# Patient Record
Sex: MALE | HISPANIC OR LATINO | URBAN - METROPOLITAN AREA
[De-identification: names, ages, dates, MRNs, and addresses within clinical notes are randomized per-mention and may not be internally consistent; named-entity substitution may affect disease eponyms.]

---

## 2021-03-03 ENCOUNTER — HOSPITAL ENCOUNTER (OUTPATIENT)
Dept: VACCINE CLINIC | Facility: HOSPITAL | Age: 36
Discharge: HOME | End: 2021-03-03
Attending: INTERNAL MEDICINE

## 2021-03-24 ENCOUNTER — HOSPITAL ENCOUNTER (OUTPATIENT)
Dept: VACCINE CLINIC | Facility: HOSPITAL | Age: 36
Discharge: HOME | End: 2021-03-24
Attending: INTERNAL MEDICINE

## 2022-08-09 ENCOUNTER — APPOINTMENT (OUTPATIENT)
Dept: CT IMAGING | Facility: HOSPITAL | Age: 37
End: 2022-08-09

## 2022-08-09 ENCOUNTER — APPOINTMENT (OUTPATIENT)
Dept: GENERAL RADIOLOGY | Facility: HOSPITAL | Age: 37
End: 2022-08-09

## 2022-08-09 ENCOUNTER — HOSPITAL ENCOUNTER (INPATIENT)
Facility: HOSPITAL | Age: 37
LOS: 3 days | Discharge: HOME | End: 2022-08-12
Attending: EMERGENCY MEDICINE | Admitting: HOSPITALIST

## 2022-08-09 DIAGNOSIS — I48.91 ATRIAL FIBRILLATION WITH RAPID VENTRICULAR RESPONSE: ICD-10-CM

## 2022-08-09 DIAGNOSIS — R41.82 ALTERED MENTAL STATUS, UNSPECIFIED ALTERED MENTAL STATUS TYPE: Primary | ICD-10-CM

## 2022-08-09 DIAGNOSIS — A41.9 SEPSIS, DUE TO UNSPECIFIED ORGANISM, UNSPECIFIED WHETHER ACUTE ORGAN DYSFUNCTION PRESENT: ICD-10-CM

## 2022-08-09 DIAGNOSIS — F19.10 SUBSTANCE ABUSE: ICD-10-CM

## 2022-08-09 DIAGNOSIS — J18.9 MULTIFOCAL PNEUMONIA: ICD-10-CM

## 2022-08-09 DIAGNOSIS — R09.02 HYPOXIA: ICD-10-CM

## 2022-08-09 LAB
ALBUMIN SERPL-MCNC: 4.5 G/DL (ref 3.5–5.2)
ALBUMIN/GLOB SERPL: 1.5 G/DL
ALP SERPL-CCNC: 80 U/L (ref 39–117)
ALT SERPL W P-5'-P-CCNC: 35 U/L (ref 1–41)
AMPHET+METHAMPHET UR QL: POSITIVE
ANION GAP SERPL CALCULATED.3IONS-SCNC: 12.9 MMOL/L (ref 5–15)
APAP SERPL-MCNC: <5 MCG/ML (ref 0–30)
APTT PPP: 28.3 SECONDS (ref 24.2–34.2)
APTT PPP: 64.8 SECONDS (ref 24.2–34.2)
ARTERIAL PATENCY WRIST A: NEGATIVE
ARTERIAL PATENCY WRIST A: POSITIVE
AST SERPL-CCNC: 43 U/L (ref 1–40)
BARBITURATES UR QL SCN: NEGATIVE
BASE EXCESS BLDA CALC-SCNC: -10.7 MMOL/L (ref -2–2)
BASE EXCESS BLDA CALC-SCNC: -8 MMOL/L (ref -2–2)
BASE EXCESS BLDA CALC-SCNC: -8.5 MMOL/L (ref -2–2)
BASE EXCESS BLDA CALC-SCNC: -9.5 MMOL/L (ref -2–2)
BASOPHILS # BLD AUTO: 0.08 10*3/MM3 (ref 0–0.2)
BASOPHILS NFR BLD AUTO: 0.7 % (ref 0–1.5)
BDY SITE: ABNORMAL
BENZODIAZ UR QL SCN: NEGATIVE
BILIRUB SERPL-MCNC: 1.5 MG/DL (ref 0–1.2)
BUN SERPL-MCNC: 18 MG/DL (ref 6–20)
BUN/CREAT SERPL: 9.1 (ref 7–25)
CA-I BLDA-SCNC: 1.01 MMOL/L (ref 1.13–1.32)
CA-I BLDA-SCNC: 1.21 MMOL/L (ref 1.13–1.32)
CALCIUM SPEC-SCNC: 9.4 MG/DL (ref 8.6–10.5)
CANNABINOIDS SERPL QL: NEGATIVE
CHLORIDE BLDA-SCNC: 106 MMOL/L (ref 98–106)
CHLORIDE BLDA-SCNC: 115 MMOL/L (ref 98–106)
CHLORIDE SERPL-SCNC: 104 MMOL/L (ref 98–107)
CK SERPL-CCNC: 240 U/L (ref 20–200)
CO2 SERPL-SCNC: 26.1 MMOL/L (ref 22–29)
COCAINE UR QL: NEGATIVE
COHGB MFR BLD: 0.3 % (ref 0–1.5)
COHGB MFR BLD: 0.4 % (ref 0–1.5)
COHGB MFR BLD: 0.4 % (ref 0–1.5)
COHGB MFR BLD: 0.5 % (ref 0–1.5)
CREAT SERPL-MCNC: 1.98 MG/DL (ref 0.76–1.27)
D-LACTATE SERPL-SCNC: 1.7 MMOL/L (ref 0.5–2)
D-LACTATE SERPL-SCNC: 5.8 MMOL/L (ref 0.5–2)
DEPRECATED RDW RBC AUTO: 42.7 FL (ref 37–54)
EGFRCR SERPLBLD CKD-EPI 2021: 44.6 ML/MIN/1.73
EOSINOPHIL # BLD AUTO: 0.08 10*3/MM3 (ref 0–0.4)
EOSINOPHIL NFR BLD AUTO: 0.7 % (ref 0.3–6.2)
ERYTHROCYTE [DISTWIDTH] IN BLOOD BY AUTOMATED COUNT: 13.2 % (ref 12.3–15.4)
ETHANOL BLD-MCNC: <10 MG/DL (ref 0–10)
ETHANOL UR QL: <0.01 %
FHHB: 10.5 % (ref 0–5)
FHHB: 29.9 % (ref 0–5)
FHHB: 35 % (ref 0–5)
FHHB: 5.2 % (ref 0–5)
GAS FLOW AIRWAY: 0 LPM
GAS FLOW AIRWAY: 50 LPM
GLOBULIN UR ELPH-MCNC: 3 GM/DL
GLUCOSE BLDA-MCNC: 172 MMOL/L (ref 70–99)
GLUCOSE BLDA-MCNC: 53 MMOL/L (ref 70–99)
GLUCOSE BLDC GLUCOMTR-MCNC: 126 MG/DL (ref 70–99)
GLUCOSE BLDC GLUCOMTR-MCNC: 156 MG/DL (ref 70–99)
GLUCOSE BLDC GLUCOMTR-MCNC: 217 MG/DL (ref 70–99)
GLUCOSE BLDC GLUCOMTR-MCNC: 50 MG/DL (ref 70–99)
GLUCOSE BLDC GLUCOMTR-MCNC: 55 MG/DL (ref 70–99)
GLUCOSE BLDC GLUCOMTR-MCNC: 80 MG/DL (ref 70–99)
GLUCOSE BLDC GLUCOMTR-MCNC: 82 MG/DL (ref 70–99)
GLUCOSE BLDC GLUCOMTR-MCNC: 84 MG/DL (ref 70–99)
GLUCOSE BLDC GLUCOMTR-MCNC: 93 MG/DL (ref 70–99)
GLUCOSE SERPL-MCNC: 233 MG/DL (ref 65–99)
HAV IGM SERPL QL IA: NORMAL
HBV CORE IGM SERPL QL IA: NORMAL
HBV SURFACE AG SERPL QL IA: NORMAL
HCO3 BLDA-SCNC: 18.4 MMOL/L (ref 22–26)
HCO3 BLDA-SCNC: 19 MMOL/L (ref 22–26)
HCO3 BLDA-SCNC: 20.2 MMOL/L (ref 22–26)
HCO3 BLDA-SCNC: 20.3 MMOL/L (ref 22–26)
HCT VFR BLD AUTO: 54.4 % (ref 37.5–51)
HCV AB SER DONR QL: NORMAL
HGB BLD-MCNC: 16.8 G/DL (ref 13–17.7)
HGB BLDA-MCNC: 15 G/DL (ref 13.8–16.4)
HGB BLDA-MCNC: 18 G/DL (ref 13.8–16.4)
HGB BLDA-MCNC: 18.2 G/DL (ref 13.8–16.4)
HGB BLDA-MCNC: 18.6 G/DL (ref 13.8–16.4)
HIV1+2 AB SER QL: NORMAL
HOLD SPECIMEN: NORMAL
HOLD SPECIMEN: NORMAL
IMM GRANULOCYTES # BLD AUTO: 0.49 10*3/MM3 (ref 0–0.05)
IMM GRANULOCYTES NFR BLD AUTO: 4.2 % (ref 0–0.5)
INHALED O2 CONCENTRATION: 100 %
INHALED O2 CONCENTRATION: 100 %
INHALED O2 CONCENTRATION: 21 %
INHALED O2 CONCENTRATION: 21 %
INR PPP: 1.25 (ref 0.86–1.15)
L PNEUMO1 AG UR QL IA: NEGATIVE
LACTATE BLDA-SCNC: 1.46 MMOL/L (ref 0.5–2)
LACTATE BLDA-SCNC: 5.27 MMOL/L (ref 0.5–2)
LACTATE BLDA-SCNC: ABNORMAL MMOL/L
LYMPHOCYTES # BLD AUTO: 2.02 10*3/MM3 (ref 0.7–3.1)
LYMPHOCYTES NFR BLD AUTO: 17.4 % (ref 19.6–45.3)
MCH RBC QN AUTO: 27.3 PG (ref 26.6–33)
MCHC RBC AUTO-ENTMCNC: 30.9 G/DL (ref 31.5–35.7)
MCV RBC AUTO: 88.3 FL (ref 79–97)
METHADONE UR QL SCN: NEGATIVE
METHGB BLD QL: 0.1 % (ref 0–1.5)
METHGB BLD QL: 0.2 % (ref 0–1.5)
METHGB BLD QL: 0.3 % (ref 0–1.5)
METHGB BLD QL: 0.4 % (ref 0–1.5)
MODALITY: ABNORMAL
MONOCYTES # BLD AUTO: 0.45 10*3/MM3 (ref 0.1–0.9)
MONOCYTES NFR BLD AUTO: 3.9 % (ref 5–12)
NEUTROPHILS NFR BLD AUTO: 73.1 % (ref 42.7–76)
NEUTROPHILS NFR BLD AUTO: 8.49 10*3/MM3 (ref 1.7–7)
NOTE: ABNORMAL
NRBC BLD AUTO-RTO: 0 /100 WBC (ref 0–0.2)
NT-PROBNP SERPL-MCNC: 21.7 PG/ML (ref 0–450)
OPIATES UR QL: POSITIVE
OXYCODONE UR QL SCN: NEGATIVE
OXYHGB MFR BLDV: 64.3 % (ref 94–99)
OXYHGB MFR BLDV: 69.6 % (ref 94–99)
OXYHGB MFR BLDV: 88.8 % (ref 94–99)
OXYHGB MFR BLDV: 94.1 % (ref 94–99)
PCO2 BLDA: 43.9 MM HG (ref 35–45)
PCO2 BLDA: 52.2 MM HG (ref 35–45)
PCO2 BLDA: 53.5 MM HG (ref 35–45)
PCO2 BLDA: 59.9 MM HG (ref 35–45)
PEEP RESPIRATORY: 10 CM[H2O]
PH BLDA: 7.15 PH UNITS (ref 7.35–7.45)
PH BLDA: 7.17 PH UNITS (ref 7.35–7.45)
PH BLDA: 7.2 PH UNITS (ref 7.35–7.45)
PH BLDA: 7.25 PH UNITS (ref 7.35–7.45)
PLATELET # BLD AUTO: 440 10*3/MM3 (ref 140–450)
PMV BLD AUTO: 9.1 FL (ref 6–12)
PO2 BLD: 62 MM[HG] (ref 0–500)
PO2 BLD: 74 MM[HG] (ref 0–500)
PO2 BLD: <193 MM[HG] (ref 0–500)
PO2 BLD: <193 MM[HG] (ref 0–500)
PO2 BLDA: 61.8 MM HG (ref 80–100)
PO2 BLDA: 74.3 MM HG (ref 80–100)
PO2 BLDA: <40.5 MM HG (ref 80–100)
PO2 BLDA: <40.5 MM HG (ref 80–100)
POTASSIUM BLDA-SCNC: 3.33 MMOL/L (ref 3.5–5)
POTASSIUM BLDA-SCNC: 3.87 MMOL/L (ref 3.5–5)
POTASSIUM SERPL-SCNC: 3.9 MMOL/L (ref 3.5–5.2)
PROCALCITONIN SERPL-MCNC: 5.5 NG/ML (ref 0–0.25)
PROT SERPL-MCNC: 7.5 G/DL (ref 6–8.5)
PROTHROMBIN TIME: 15.9 SECONDS (ref 11.8–14.9)
QT INTERVAL: 332 MS
RBC # BLD AUTO: 6.16 10*6/MM3 (ref 4.14–5.8)
S PNEUM AG SPEC QL LA: NEGATIVE
SALICYLATES SERPL-MCNC: <0.3 MG/DL
SAO2 % BLDCOA: 64.8 % (ref 95–99)
SAO2 % BLDCOA: 69.9 % (ref 95–99)
SAO2 % BLDCOA: 89.4 % (ref 95–99)
SAO2 % BLDCOA: 94.8 % (ref 95–99)
SARS-COV-2 RNA PNL SPEC NAA+PROBE: NOT DETECTED
SET MECH RESP RATE: 20
SODIUM BLDA-SCNC: 142.3 MMOL/L (ref 136–146)
SODIUM BLDA-SCNC: 143.1 MMOL/L (ref 136–146)
SODIUM SERPL-SCNC: 143 MMOL/L (ref 136–145)
T4 FREE SERPL-MCNC: 1.18 NG/DL (ref 0.93–1.7)
TROPONIN T SERPL-MCNC: 0.03 NG/ML (ref 0–0.03)
TSH SERPL DL<=0.05 MIU/L-ACNC: 0.69 UIU/ML (ref 0.27–4.2)
TSH SERPL DL<=0.05 MIU/L-ACNC: 2.09 UIU/ML (ref 0.27–4.2)
VENTILATOR MODE: AC
WBC NRBC COR # BLD: 11.61 10*3/MM3 (ref 3.4–10.8)
WHOLE BLOOD HOLD COAG: NORMAL
WHOLE BLOOD HOLD SPECIMEN: NORMAL

## 2022-08-09 PROCEDURE — 71045 X-RAY EXAM CHEST 1 VIEW: CPT

## 2022-08-09 PROCEDURE — P9612 CATHETERIZE FOR URINE SPEC: HCPCS

## 2022-08-09 PROCEDURE — 94799 UNLISTED PULMONARY SVC/PX: CPT

## 2022-08-09 PROCEDURE — 93005 ELECTROCARDIOGRAM TRACING: CPT | Performed by: EMERGENCY MEDICINE

## 2022-08-09 PROCEDURE — 80307 DRUG TEST PRSMV CHEM ANLYZR: CPT | Performed by: EMERGENCY MEDICINE

## 2022-08-09 PROCEDURE — 70450 CT HEAD/BRAIN W/O DYE: CPT

## 2022-08-09 PROCEDURE — 83050 HGB METHEMOGLOBIN QUAN: CPT | Performed by: EMERGENCY MEDICINE

## 2022-08-09 PROCEDURE — 83605 ASSAY OF LACTIC ACID: CPT | Performed by: EMERGENCY MEDICINE

## 2022-08-09 PROCEDURE — 25010000002 CEFEPIME PER 500 MG: Performed by: EMERGENCY MEDICINE

## 2022-08-09 PROCEDURE — 80179 DRUG ASSAY SALICYLATE: CPT | Performed by: EMERGENCY MEDICINE

## 2022-08-09 PROCEDURE — 82550 ASSAY OF CK (CPK): CPT | Performed by: EMERGENCY MEDICINE

## 2022-08-09 PROCEDURE — 31500 INSERT EMERGENCY AIRWAY: CPT | Performed by: INTERNAL MEDICINE

## 2022-08-09 PROCEDURE — 94660 CPAP INITIATION&MGMT: CPT

## 2022-08-09 PROCEDURE — 85730 THROMBOPLASTIN TIME PARTIAL: CPT | Performed by: HOSPITALIST

## 2022-08-09 PROCEDURE — 82962 GLUCOSE BLOOD TEST: CPT

## 2022-08-09 PROCEDURE — 0BH17EZ INSERTION OF ENDOTRACHEAL AIRWAY INTO TRACHEA, VIA NATURAL OR ARTIFICIAL OPENING: ICD-10-PCS | Performed by: INTERNAL MEDICINE

## 2022-08-09 PROCEDURE — 99291 CRITICAL CARE FIRST HOUR: CPT | Performed by: INTERNAL MEDICINE

## 2022-08-09 PROCEDURE — 93010 ELECTROCARDIOGRAM REPORT: CPT | Performed by: INTERNAL MEDICINE

## 2022-08-09 PROCEDURE — 02HV33Z INSERTION OF INFUSION DEVICE INTO SUPERIOR VENA CAVA, PERCUTANEOUS APPROACH: ICD-10-PCS | Performed by: INTERNAL MEDICINE

## 2022-08-09 PROCEDURE — 76937 US GUIDE VASCULAR ACCESS: CPT | Performed by: PHYSICIAN ASSISTANT

## 2022-08-09 PROCEDURE — 82805 BLOOD GASES W/O2 SATURATION: CPT | Performed by: EMERGENCY MEDICINE

## 2022-08-09 PROCEDURE — 36600 WITHDRAWAL OF ARTERIAL BLOOD: CPT | Performed by: INTERNAL MEDICINE

## 2022-08-09 PROCEDURE — 25010000002 NALOXONE HCL 2 MG/2ML SOLUTION PREFILLED SYRINGE

## 2022-08-09 PROCEDURE — 80053 COMPREHEN METABOLIC PANEL: CPT | Performed by: EMERGENCY MEDICINE

## 2022-08-09 PROCEDURE — 86631 CHLAMYDIA ANTIBODY: CPT | Performed by: HOSPITALIST

## 2022-08-09 PROCEDURE — 84443 ASSAY THYROID STIM HORMONE: CPT | Performed by: EMERGENCY MEDICINE

## 2022-08-09 PROCEDURE — 25010000002 CALCIUM GLUCONATE-NACL 1-0.675 GM/50ML-% SOLUTION: Performed by: HOSPITALIST

## 2022-08-09 PROCEDURE — 84443 ASSAY THYROID STIM HORMONE: CPT | Performed by: INTERNAL MEDICINE

## 2022-08-09 PROCEDURE — 25010000002 HEPARIN (PORCINE) 25000-0.45 UT/250ML-% SOLUTION: Performed by: HOSPITALIST

## 2022-08-09 PROCEDURE — 85025 COMPLETE CBC W/AUTO DIFF WBC: CPT | Performed by: EMERGENCY MEDICINE

## 2022-08-09 PROCEDURE — 36415 COLL VENOUS BLD VENIPUNCTURE: CPT | Performed by: EMERGENCY MEDICINE

## 2022-08-09 PROCEDURE — 82805 BLOOD GASES W/O2 SATURATION: CPT | Performed by: INTERNAL MEDICINE

## 2022-08-09 PROCEDURE — G0432 EIA HIV-1/HIV-2 SCREEN: HCPCS | Performed by: PHYSICIAN ASSISTANT

## 2022-08-09 PROCEDURE — 86713 LEGIONELLA ANTIBODY: CPT | Performed by: HOSPITALIST

## 2022-08-09 PROCEDURE — 25010000002 PROPOFOL 10 MG/ML EMULSION: Performed by: INTERNAL MEDICINE

## 2022-08-09 PROCEDURE — 99285 EMERGENCY DEPT VISIT HI MDM: CPT

## 2022-08-09 PROCEDURE — U0004 COV-19 TEST NON-CDC HGH THRU: HCPCS | Performed by: EMERGENCY MEDICINE

## 2022-08-09 PROCEDURE — 80074 ACUTE HEPATITIS PANEL: CPT | Performed by: PHYSICIAN ASSISTANT

## 2022-08-09 PROCEDURE — 25010000002 PROPOFOL 10 MG/ML EMULSION

## 2022-08-09 PROCEDURE — 82077 ASSAY SPEC XCP UR&BREATH IA: CPT | Performed by: EMERGENCY MEDICINE

## 2022-08-09 PROCEDURE — 87899 AGENT NOS ASSAY W/OPTIC: CPT | Performed by: HOSPITALIST

## 2022-08-09 PROCEDURE — 83050 HGB METHEMOGLOBIN QUAN: CPT | Performed by: INTERNAL MEDICINE

## 2022-08-09 PROCEDURE — 99291 CRITICAL CARE FIRST HOUR: CPT | Performed by: HOSPITALIST

## 2022-08-09 PROCEDURE — 94002 VENT MGMT INPAT INIT DAY: CPT

## 2022-08-09 PROCEDURE — 84439 ASSAY OF FREE THYROXINE: CPT | Performed by: EMERGENCY MEDICINE

## 2022-08-09 PROCEDURE — 80143 DRUG ASSAY ACETAMINOPHEN: CPT | Performed by: EMERGENCY MEDICINE

## 2022-08-09 PROCEDURE — 25010000002 FUROSEMIDE PER 20 MG: Performed by: PHYSICIAN ASSISTANT

## 2022-08-09 PROCEDURE — 25010000002 FENTANYL CITRATE (PF) 50 MCG/ML SOLUTION: Performed by: INTERNAL MEDICINE

## 2022-08-09 PROCEDURE — 86603 ADENOVIRUS ANTIBODY: CPT | Performed by: HOSPITALIST

## 2022-08-09 PROCEDURE — 36556 INSERT NON-TUNNEL CV CATH: CPT | Performed by: PHYSICIAN ASSISTANT

## 2022-08-09 PROCEDURE — 36600 WITHDRAWAL OF ARTERIAL BLOOD: CPT | Performed by: EMERGENCY MEDICINE

## 2022-08-09 PROCEDURE — 5A1935Z RESPIRATORY VENTILATION, LESS THAN 24 CONSECUTIVE HOURS: ICD-10-PCS | Performed by: INTERNAL MEDICINE

## 2022-08-09 PROCEDURE — 87449 NOS EACH ORGANISM AG IA: CPT | Performed by: HOSPITALIST

## 2022-08-09 PROCEDURE — 82375 ASSAY CARBOXYHB QUANT: CPT | Performed by: EMERGENCY MEDICINE

## 2022-08-09 PROCEDURE — 71250 CT THORAX DX C-: CPT

## 2022-08-09 PROCEDURE — 86738 MYCOPLASMA ANTIBODY: CPT | Performed by: HOSPITALIST

## 2022-08-09 PROCEDURE — 83880 ASSAY OF NATRIURETIC PEPTIDE: CPT | Performed by: EMERGENCY MEDICINE

## 2022-08-09 PROCEDURE — B548ZZA ULTRASONOGRAPHY OF SUPERIOR VENA CAVA, GUIDANCE: ICD-10-PCS | Performed by: INTERNAL MEDICINE

## 2022-08-09 PROCEDURE — 84484 ASSAY OF TROPONIN QUANT: CPT | Performed by: EMERGENCY MEDICINE

## 2022-08-09 PROCEDURE — 84145 PROCALCITONIN (PCT): CPT | Performed by: HOSPITALIST

## 2022-08-09 PROCEDURE — 82375 ASSAY CARBOXYHB QUANT: CPT | Performed by: INTERNAL MEDICINE

## 2022-08-09 PROCEDURE — 25010000002 LORAZEPAM PER 2 MG: Performed by: HOSPITALIST

## 2022-08-09 PROCEDURE — 25010000002 CEFEPIME PER 500 MG: Performed by: HOSPITALIST

## 2022-08-09 PROCEDURE — 25010000002 VANCOMYCIN 5 G RECONSTITUTED SOLUTION: Performed by: HOSPITALIST

## 2022-08-09 PROCEDURE — 87040 BLOOD CULTURE FOR BACTERIA: CPT | Performed by: EMERGENCY MEDICINE

## 2022-08-09 PROCEDURE — 25010000002 VANCOMYCIN 5 G RECONSTITUTED SOLUTION: Performed by: EMERGENCY MEDICINE

## 2022-08-09 PROCEDURE — 85610 PROTHROMBIN TIME: CPT | Performed by: HOSPITALIST

## 2022-08-09 RX ORDER — NALOXONE HCL 0.4 MG/ML
0.4 VIAL (ML) INJECTION
Status: DISCONTINUED | OUTPATIENT
Start: 2022-08-09 | End: 2022-08-12 | Stop reason: HOSPADM

## 2022-08-09 RX ORDER — LORAZEPAM 2 MG/ML
2 INJECTION INTRAMUSCULAR
Status: DISCONTINUED | OUTPATIENT
Start: 2022-08-09 | End: 2022-08-10

## 2022-08-09 RX ORDER — FUROSEMIDE 10 MG/ML
40 INJECTION INTRAMUSCULAR; INTRAVENOUS EVERY 12 HOURS
Status: DISCONTINUED | OUTPATIENT
Start: 2022-08-09 | End: 2022-08-11

## 2022-08-09 RX ORDER — LORAZEPAM 2 MG/1
4 TABLET ORAL
Status: DISCONTINUED | OUTPATIENT
Start: 2022-08-09 | End: 2022-08-09

## 2022-08-09 RX ORDER — LORAZEPAM 2 MG/ML
4 INJECTION INTRAMUSCULAR
Status: DISCONTINUED | OUTPATIENT
Start: 2022-08-09 | End: 2022-08-10

## 2022-08-09 RX ORDER — LORAZEPAM 2 MG/ML
4 INJECTION INTRAMUSCULAR
Status: DISCONTINUED | OUTPATIENT
Start: 2022-08-09 | End: 2022-08-09

## 2022-08-09 RX ORDER — LORAZEPAM 2 MG/ML
2 INJECTION INTRAMUSCULAR
Status: DISCONTINUED | OUTPATIENT
Start: 2022-08-09 | End: 2022-08-09

## 2022-08-09 RX ORDER — ALUMINA, MAGNESIA, AND SIMETHICONE 2400; 2400; 240 MG/30ML; MG/30ML; MG/30ML
15 SUSPENSION ORAL EVERY 6 HOURS PRN
Status: DISCONTINUED | OUTPATIENT
Start: 2022-08-09 | End: 2022-08-10

## 2022-08-09 RX ORDER — SODIUM CHLORIDE 0.9 % (FLUSH) 0.9 %
10 SYRINGE (ML) INJECTION AS NEEDED
Status: DISCONTINUED | OUTPATIENT
Start: 2022-08-09 | End: 2022-08-12 | Stop reason: HOSPADM

## 2022-08-09 RX ORDER — ONDANSETRON 2 MG/ML
4 INJECTION INTRAMUSCULAR; INTRAVENOUS EVERY 6 HOURS PRN
Status: DISCONTINUED | OUTPATIENT
Start: 2022-08-09 | End: 2022-08-10

## 2022-08-09 RX ORDER — POLYETHYLENE GLYCOL 3350 17 G/17G
17 POWDER, FOR SOLUTION ORAL DAILY PRN
Status: DISCONTINUED | OUTPATIENT
Start: 2022-08-09 | End: 2022-08-10

## 2022-08-09 RX ORDER — SODIUM CHLORIDE 0.9 % (FLUSH) 0.9 %
10 SYRINGE (ML) INJECTION AS NEEDED
Status: DISCONTINUED | OUTPATIENT
Start: 2022-08-09 | End: 2022-08-09

## 2022-08-09 RX ORDER — POLYETHYLENE GLYCOL 3350 17 G/17G
17 POWDER, FOR SOLUTION ORAL DAILY PRN
Status: DISCONTINUED | OUTPATIENT
Start: 2022-08-09 | End: 2022-08-09

## 2022-08-09 RX ORDER — LORAZEPAM 2 MG/ML
1 INJECTION INTRAMUSCULAR
Status: DISCONTINUED | OUTPATIENT
Start: 2022-08-09 | End: 2022-08-10

## 2022-08-09 RX ORDER — FENTANYL CITRATE-0.9 % NACL/PF 10 MCG/ML
50-300 PLASTIC BAG, INJECTION (ML) INTRAVENOUS
Status: DISCONTINUED | OUTPATIENT
Start: 2022-08-09 | End: 2022-08-10

## 2022-08-09 RX ORDER — NALOXONE HYDROCHLORIDE 1 MG/ML
2 INJECTION INTRAMUSCULAR; INTRAVENOUS; SUBCUTANEOUS ONCE
Status: COMPLETED | OUTPATIENT
Start: 2022-08-09 | End: 2022-08-09

## 2022-08-09 RX ORDER — CEFEPIME 1 G/50ML
2 INJECTION, SOLUTION INTRAVENOUS EVERY 12 HOURS
Status: DISCONTINUED | OUTPATIENT
Start: 2022-08-09 | End: 2022-08-10

## 2022-08-09 RX ORDER — DEXTROSE MONOHYDRATE 25 G/50ML
25 INJECTION, SOLUTION INTRAVENOUS
Status: DISCONTINUED | OUTPATIENT
Start: 2022-08-09 | End: 2022-08-12 | Stop reason: HOSPADM

## 2022-08-09 RX ORDER — BISACODYL 5 MG/1
5 TABLET, DELAYED RELEASE ORAL DAILY PRN
Status: DISCONTINUED | OUTPATIENT
Start: 2022-08-09 | End: 2022-08-09

## 2022-08-09 RX ORDER — LORAZEPAM 2 MG/1
2 TABLET ORAL
Status: DISCONTINUED | OUTPATIENT
Start: 2022-08-09 | End: 2022-08-09

## 2022-08-09 RX ORDER — NALOXONE HYDROCHLORIDE 1 MG/ML
INJECTION INTRAMUSCULAR; INTRAVENOUS; SUBCUTANEOUS
Status: COMPLETED
Start: 2022-08-09 | End: 2022-08-09

## 2022-08-09 RX ORDER — SODIUM CHLORIDE 9 MG/ML
40 INJECTION, SOLUTION INTRAVENOUS AS NEEDED
Status: DISCONTINUED | OUTPATIENT
Start: 2022-08-09 | End: 2022-08-12 | Stop reason: HOSPADM

## 2022-08-09 RX ORDER — FENTANYL CITRATE 50 UG/ML
50 INJECTION, SOLUTION INTRAMUSCULAR; INTRAVENOUS
Status: DISCONTINUED | OUTPATIENT
Start: 2022-08-09 | End: 2022-08-10

## 2022-08-09 RX ORDER — FAMOTIDINE 10 MG/ML
20 INJECTION, SOLUTION INTRAVENOUS 2 TIMES DAILY
Status: DISCONTINUED | OUTPATIENT
Start: 2022-08-09 | End: 2022-08-10

## 2022-08-09 RX ORDER — LORAZEPAM 0.5 MG/1
1 TABLET ORAL
Status: DISCONTINUED | OUTPATIENT
Start: 2022-08-09 | End: 2022-08-10

## 2022-08-09 RX ORDER — LORAZEPAM 2 MG/ML
1 INJECTION INTRAMUSCULAR
Status: DISCONTINUED | OUTPATIENT
Start: 2022-08-09 | End: 2022-08-09

## 2022-08-09 RX ORDER — IPRATROPIUM BROMIDE AND ALBUTEROL SULFATE 2.5; .5 MG/3ML; MG/3ML
3 SOLUTION RESPIRATORY (INHALATION) EVERY 4 HOURS PRN
Status: DISCONTINUED | OUTPATIENT
Start: 2022-08-09 | End: 2022-08-12 | Stop reason: HOSPADM

## 2022-08-09 RX ORDER — BISACODYL 10 MG
10 SUPPOSITORY, RECTAL RECTAL DAILY PRN
Status: DISCONTINUED | OUTPATIENT
Start: 2022-08-09 | End: 2022-08-09

## 2022-08-09 RX ORDER — ALUMINA, MAGNESIA, AND SIMETHICONE 2400; 2400; 240 MG/30ML; MG/30ML; MG/30ML
15 SUSPENSION ORAL EVERY 6 HOURS PRN
Status: DISCONTINUED | OUTPATIENT
Start: 2022-08-09 | End: 2022-08-09

## 2022-08-09 RX ORDER — PROPOFOL 10 MG/ML
VIAL (ML) INTRAVENOUS
Status: COMPLETED
Start: 2022-08-09 | End: 2022-08-09

## 2022-08-09 RX ORDER — ENOXAPARIN SODIUM 100 MG/ML
40 INJECTION SUBCUTANEOUS DAILY
Status: DISCONTINUED | OUTPATIENT
Start: 2022-08-09 | End: 2022-08-09

## 2022-08-09 RX ORDER — AMOXICILLIN 250 MG
2 CAPSULE ORAL 2 TIMES DAILY
Status: DISCONTINUED | OUTPATIENT
Start: 2022-08-09 | End: 2022-08-10

## 2022-08-09 RX ORDER — LORAZEPAM 2 MG/1
2 TABLET ORAL
Status: DISCONTINUED | OUTPATIENT
Start: 2022-08-09 | End: 2022-08-10

## 2022-08-09 RX ORDER — ONDANSETRON 4 MG/1
4 TABLET, FILM COATED ORAL EVERY 6 HOURS PRN
Status: DISCONTINUED | OUTPATIENT
Start: 2022-08-09 | End: 2022-08-09

## 2022-08-09 RX ORDER — NITROGLYCERIN 0.4 MG/1
0.4 TABLET SUBLINGUAL
Status: DISCONTINUED | OUTPATIENT
Start: 2022-08-09 | End: 2022-08-12 | Stop reason: HOSPADM

## 2022-08-09 RX ORDER — NICOTINE POLACRILEX 4 MG
15 LOZENGE BUCCAL
Status: DISCONTINUED | OUTPATIENT
Start: 2022-08-09 | End: 2022-08-12 | Stop reason: HOSPADM

## 2022-08-09 RX ORDER — LORAZEPAM 2 MG/1
4 TABLET ORAL
Status: DISCONTINUED | OUTPATIENT
Start: 2022-08-09 | End: 2022-08-10

## 2022-08-09 RX ORDER — AMOXICILLIN 250 MG
2 CAPSULE ORAL 2 TIMES DAILY
Status: DISCONTINUED | OUTPATIENT
Start: 2022-08-09 | End: 2022-08-09

## 2022-08-09 RX ORDER — INSULIN LISPRO 100 [IU]/ML
0-14 INJECTION, SOLUTION INTRAVENOUS; SUBCUTANEOUS
Status: DISCONTINUED | OUTPATIENT
Start: 2022-08-09 | End: 2022-08-12 | Stop reason: HOSPADM

## 2022-08-09 RX ORDER — LORAZEPAM 0.5 MG/1
1 TABLET ORAL
Status: DISCONTINUED | OUTPATIENT
Start: 2022-08-09 | End: 2022-08-09

## 2022-08-09 RX ORDER — CALCIUM GLUCONATE 20 MG/ML
1 INJECTION, SOLUTION INTRAVENOUS ONCE
Status: COMPLETED | OUTPATIENT
Start: 2022-08-09 | End: 2022-08-09

## 2022-08-09 RX ORDER — DEXTROSE MONOHYDRATE 25 G/50ML
INJECTION, SOLUTION INTRAVENOUS
Status: COMPLETED
Start: 2022-08-09 | End: 2022-08-09

## 2022-08-09 RX ORDER — ONDANSETRON 2 MG/ML
4 INJECTION INTRAMUSCULAR; INTRAVENOUS EVERY 6 HOURS PRN
Status: DISCONTINUED | OUTPATIENT
Start: 2022-08-09 | End: 2022-08-09

## 2022-08-09 RX ORDER — LORAZEPAM 2 MG/ML
2 INJECTION INTRAMUSCULAR
Status: DISCONTINUED | OUTPATIENT
Start: 2022-08-09 | End: 2022-08-12 | Stop reason: HOSPADM

## 2022-08-09 RX ORDER — DILTIAZEM HCL IN NACL,ISO-OSM 125 MG/125
5-15 PLASTIC BAG, INJECTION (ML) INTRAVENOUS
Status: DISCONTINUED | OUTPATIENT
Start: 2022-08-09 | End: 2022-08-10

## 2022-08-09 RX ORDER — ACETAMINOPHEN 325 MG/1
650 TABLET ORAL EVERY 6 HOURS PRN
Status: DISCONTINUED | OUTPATIENT
Start: 2022-08-09 | End: 2022-08-10

## 2022-08-09 RX ORDER — HEPARIN SODIUM 10000 [USP'U]/100ML
12 INJECTION, SOLUTION INTRAVENOUS
Status: DISCONTINUED | OUTPATIENT
Start: 2022-08-09 | End: 2022-08-10

## 2022-08-09 RX ORDER — BISACODYL 10 MG
10 SUPPOSITORY, RECTAL RECTAL DAILY PRN
Status: DISCONTINUED | OUTPATIENT
Start: 2022-08-09 | End: 2022-08-10

## 2022-08-09 RX ORDER — CHLORHEXIDINE GLUCONATE 0.12 MG/ML
15 RINSE ORAL EVERY 12 HOURS SCHEDULED
Status: DISCONTINUED | OUTPATIENT
Start: 2022-08-09 | End: 2022-08-10

## 2022-08-09 RX ORDER — ONDANSETRON 4 MG/1
4 TABLET, FILM COATED ORAL EVERY 6 HOURS PRN
Status: DISCONTINUED | OUTPATIENT
Start: 2022-08-09 | End: 2022-08-10

## 2022-08-09 RX ORDER — SODIUM CHLORIDE 9 MG/ML
125 INJECTION, SOLUTION INTRAVENOUS CONTINUOUS
Status: DISCONTINUED | OUTPATIENT
Start: 2022-08-09 | End: 2022-08-09

## 2022-08-09 RX ORDER — SODIUM CHLORIDE 0.9 % (FLUSH) 0.9 %
10 SYRINGE (ML) INJECTION EVERY 12 HOURS SCHEDULED
Status: DISCONTINUED | OUTPATIENT
Start: 2022-08-09 | End: 2022-08-12 | Stop reason: HOSPADM

## 2022-08-09 RX ORDER — CEFEPIME 1 G/50ML
2 INJECTION, SOLUTION INTRAVENOUS ONCE
Status: COMPLETED | OUTPATIENT
Start: 2022-08-09 | End: 2022-08-09

## 2022-08-09 RX ORDER — NOREPINEPHRINE BIT/0.9 % NACL 8 MG/250ML
.02-.3 INFUSION BOTTLE (ML) INTRAVENOUS
Status: DISCONTINUED | OUTPATIENT
Start: 2022-08-09 | End: 2022-08-11

## 2022-08-09 RX ADMIN — FUROSEMIDE 40 MG: 10 INJECTION, SOLUTION INTRAMUSCULAR; INTRAVENOUS at 13:28

## 2022-08-09 RX ADMIN — SENNOSIDES AND DOCUSATE SODIUM 2 TABLET: 50; 8.6 TABLET ORAL at 13:28

## 2022-08-09 RX ADMIN — FAMOTIDINE 20 MG: 10 INJECTION INTRAVENOUS at 20:48

## 2022-08-09 RX ADMIN — NALOXONE HYDROCHLORIDE 2 MG: 1 INJECTION PARENTERAL at 11:36

## 2022-08-09 RX ADMIN — FENTANYL CITRATE 50 MCG: 50 INJECTION, SOLUTION INTRAMUSCULAR; INTRAVENOUS at 20:00

## 2022-08-09 RX ADMIN — PROPOFOL 50 MCG/KG/MIN: 10 INJECTION, EMULSION INTRAVENOUS at 20:49

## 2022-08-09 RX ADMIN — Medication 0.02 MCG/KG/MIN: at 14:42

## 2022-08-09 RX ADMIN — PROPOFOL 5 MCG/KG/MIN: 10 INJECTION, EMULSION INTRAVENOUS at 17:10

## 2022-08-09 RX ADMIN — Medication 1750 MG: at 10:38

## 2022-08-09 RX ADMIN — Medication 10 ML: at 20:48

## 2022-08-09 RX ADMIN — CHLORHEXIDINE GLUCONATE 15 ML: 1.2 RINSE ORAL at 20:49

## 2022-08-09 RX ADMIN — DEXTROSE MONOHYDRATE: 500 INJECTION PARENTERAL at 11:33

## 2022-08-09 RX ADMIN — LORAZEPAM 1 MG: 2 INJECTION INTRAMUSCULAR at 15:49

## 2022-08-09 RX ADMIN — HEPARIN SODIUM 12 UNITS/KG/HR: 10000 INJECTION, SOLUTION INTRAVENOUS at 14:16

## 2022-08-09 RX ADMIN — SODIUM CHLORIDE 2619 ML: 9 INJECTION, SOLUTION INTRAVENOUS at 10:37

## 2022-08-09 RX ADMIN — Medication 10 ML: at 13:28

## 2022-08-09 RX ADMIN — SODIUM CHLORIDE 1000 ML: 9 INJECTION, SOLUTION INTRAVENOUS at 09:55

## 2022-08-09 RX ADMIN — CEFEPIME 2 G: 1 INJECTION, SOLUTION INTRAVENOUS at 10:30

## 2022-08-09 RX ADMIN — Medication 5 MG/HR: at 13:26

## 2022-08-09 RX ADMIN — DEXTROSE MONOHYDRATE 25 G: 25 INJECTION, SOLUTION INTRAVENOUS at 13:29

## 2022-08-09 RX ADMIN — ACETAMINOPHEN 325MG 650 MG: 325 TABLET ORAL at 18:28

## 2022-08-09 RX ADMIN — FENTANYL CITRATE 50 MCG: 50 INJECTION, SOLUTION INTRAMUSCULAR; INTRAVENOUS at 17:15

## 2022-08-09 RX ADMIN — CALCIUM GLUCONATE 1 G: 20 INJECTION, SOLUTION INTRAVENOUS at 13:28

## 2022-08-09 RX ADMIN — SENNOSIDES AND DOCUSATE SODIUM 2 TABLET: 50; 8.6 TABLET ORAL at 20:47

## 2022-08-09 RX ADMIN — CEFEPIME 2 G: 1 INJECTION, SOLUTION INTRAVENOUS at 20:48

## 2022-08-09 RX ADMIN — NALOXONE HYDROCHLORIDE 2 MG: 1 INJECTION INTRAMUSCULAR; INTRAVENOUS; SUBCUTANEOUS at 11:36

## 2022-08-09 RX ADMIN — Medication 50 MCG/HR: at 20:09

## 2022-08-09 RX ADMIN — VANCOMYCIN HYDROCHLORIDE 750 MG: 5 INJECTION, POWDER, LYOPHILIZED, FOR SOLUTION INTRAVENOUS at 22:11

## 2022-08-09 NOTE — CONSULTS
[facility]   Consult Note    Patient Name: Gregory Miller  : 1985  MRN: [id number]  Primary Care Physician:  Provider, No Known  Referring Physician: No Known Provider  Date of admission: 2022    Inpatient Pulmonology Consult  Consult performed by: Jose Melendrez DO  Consult ordered by: Gregory Estevez DO        Subjective   Subjective     Reason for Consult/ Chief Complaint: Reason for consultation respiratory failure    History of Present Illness  Gregory Miller is a 36 y.o. male critically ill male in the ICU with respiratory failure  Patient was dropped off by friends today in the ER was very somnolent head borderline low glucose arterial blood gas does show a confirmed presence of both hypoxic and hypercapnic respiratory failure  The patient was given Narcan with some improvement in his mental status  He was given a second dose of Narcan and did start waking up more and endorse that he injected heroin  The patient is profoundly hypoxic and tachycardic  Chest imaging shows multifocal airspace disease    The patient also has pain around his nose and his face concerning for inhalational drug use as well however the patient has denied any paint huffing    Review of Systems   Able to obtain due to mental status  Personal History     Past Medical History:   Diagnosis Date   • Seizures (HCC)     childhood, last one at age 14   • Traumatic brain injury (HCC)     3 years ago       Past Surgical History:   Procedure Laterality Date   • BRAIN SURGERY         Family History: family history is not on file. Otherwise pertinent FHx was reviewed and not pertinent to current issue.    Social History:  reports that he has never smoked. He has never used smokeless tobacco. He reports current drug use. Drug: IV. He reports that he does not drink alcohol.    Home Medications:        Allergies:  No Known Allergies    Objective    Objective     Vitals:  Temp:  [93.4 °F (34.1  °C)-96.6 °F (35.9 °C)] 96.6 °F (35.9 °C)  Heart Rate:  [113-152] 139  Resp:  [26-39] 39  BP: ()/() 84/56  Flow (L/min):  [50] 50    Physical Exam  General ill-appearing 36-year-old male who appears older than stated age  Mouth and face-patient appears to have pain around his nose and the right side of his face  Lungs scattered crackles patient does have tachypnea with increased work of breathing  Cardiac the patient is tachycardic  Neuro the patient is arousable he is able to follow commands and answer questions but is somnolent  His abdomen is soft nontender nondistended  Result Review    Result Review:  I have personally reviewed the results from the time of this admission to 8/9/2022 12:54 EDT and agree with these findings:  [x]  Laboratory  [x]  Microbiology  [x]  Radiology  [x]  EKG/Telemetry   []  Cardiology/Vascular   []  Pathology  []  Old records  []  Other:    Most notable findings include: CT scan showing multifocal pneumonia    Assessment & Plan   Assessment / Plan     Brief Patient Summary:  Gregory Miller is a 36 y.o. male who clearly in the intensive care unit with respiratory failure    Active Hospital Problems:  Active Hospital Problems    Diagnosis    • AMS (altered mental status)      Assessment  aCute hypoxic and hypercapnic respiratory failure  Noncardiogenic pulmonary edema secondary to heroin  Acute heroin ingestion  Altered mental status-secondary to metabolic encephalopathy from heroin injection and CO2 narcosis  Assessment  Respiratory acidosis  Hemoptysis  Lactic acidosis  Acute renal failure  Transaminitis      Plan:   Patient has responded now after receiving additional dose of Narcan patient does endorse that he did acutely inject heroin this morning    Suspect that the patient's multifocal pneumonia likely secondary to pulmonary edema from heroin and from reversal heroin by Narcan    At this time we will place patient on noninvasive positive pressure  ventilation    Check COVID and flu    Broad-spectrum antibiotics    Check procalcitonin    Check HIV and hep C status    Check twelve-lead EKG and thyroid function study given the patient's tachycardia    Patient is critically ill in ICU with  respiratory failure, heroin ingestion lactic acidosis  I spent 40 minutes of critical care time excluding any procedure notes, spent in review, analysis, obtaining history and physical, formulating care plan, and I led multi-disciplinary critical care rounds with bedside nurse, respiratory therapist, clinical pharmacist and other allied services. I have discussed the case with primary service and other consultants as well.      Electronically signed by Jose Melendrez DO, 08/09/22, 12:54 PM EDT.    Called to reassess patient  Breathing into the 50s layered with 100% nonrebreather and Airvo at 100% FiO2  Very labored still was unable tolerate noninvasive  The decision was made to intubate patient with his consent  Patient was intubated with no issues  Will place patient on assist control tidal volume of 438 respiratory rate of 20 PEEP of 10  Repeat arterial blood gas 1 hour afterwards  Chest x-ray  Electronically signed by Jose Melendrez DO, 08/09/22, 4:24 PM EDT.

## 2022-08-09 NOTE — PROCEDURES
CENTRAL LINE PLACEMENT NOTE  Indication: acute respiratory failure requiring MV  Consent obtained: performed emergently  Time out: performed with ANJALI Fuller at bedside prior to procedure    Procedure:   The patient was placed in the supine position and the skin over the patient's right IJ vein was prepped with chlorhexidine. The patient was draped with full body drape in sterile fashion/sterile procedure was used.   Large-bore needle was used to cannulate the vessel with return of dark blood.  The guidewire was inserted into the needle using the Seldinger technique then needle was removed. Ultrasound was used to confirm the placement of the wire in the vein. A small nick was made in the skin and vessel was dilated with dilator. A triple-lumen CVC catheter was then placed into the vessel over the wire and the wire was removed. RN was notified of guidewire removal. All 3 ports freely flush and draw.  The catheter was securely fastened to the skin with suture. Next a sterile dressing was placed and a CXR ordered to confirm positioning of the line.     The patient tolerated the procedure well  Complications: no immediate complications noted      Procedure name: Ultrasound guidance for central line placement  Ultrasound was used to confirm cannulation of the right IJ vein with the finder needle during central line placement. Vessel was reviewed with ultrasound prior to procedure and was patent and laid just next to the right carotid artery. Ultrasound was used to assure correct placement of cannulating needle and picture was taken of guidewire being in correct placement prior to dilating vessel and image was uploaded to patient's electronic medical record.     Electronically signed by TIANA Mills 08/09/22 19:06 EDT

## 2022-08-09 NOTE — PROGRESS NOTES
"Pharmacy Consult-Vancomycin Dosing - day 1  Gregory Miller is a  36 y.o. male receiving vancomycin therapy.     Indication: PNA  Consulting Provider: Vidya Quevedo  Duration of therapy: 5 days or TBD    Goal AUC: 400 - 600 mg/L*hr    Current Antimicrobial Therapy  Cefepime    Labs  Results from last 7 days   Lab Units 08/09/22  0936   BUN mg/dL 18   CREATININE mg/dL 1.98*     Results from last 7 days   Lab Units 08/09/22  0936   WBC 10*3/mm3 11.61*     Evaluation of Dosing  Last Dose Received in the ED/Outside Facility: 8/9 @1038 loading dose vanc 1750mg x1  Is Patient on Dialysis or Renal Replacement: No    Ht - 177.8 cm (70\")  Wt - 90 kg (198 lb 6.6 oz)    Estimated Creatinine Clearance: 59.3 mL/min (A) (by C-G formula based on SCr of 1.98 mg/dL (H)).    Intake & Output (last 3 days)         08/06 0701  08/07 0700 08/07 0701  08/08 0700 08/08 0701  08/09 0700 08/09 0701  08/10 0700    IV Piggyback    3719    Total Intake(mL/kg)    3719 (41.3)    Net    +3719                  Microbiology and Radiology  8/9 blood cx: in process  8/9 sputum: to be collected  8/9 S pneumo Ag / legionella Ag: to be collected  8/9 CXR: Extensive airspace consolidation throughout the lung fields possibly secondary to ARDS, multifocal pneumonia or pulmonary edema.     Assessment/Plan:  36yoM, 90kg patient admitted to the ICU with respiratory failure, PTD vancomycin for pneumonia. Vancomycin 1750mg x1 loading dose administered this morning @1038. Plan to start a maintenance dose of vancomycin 750mg q12h this evening. [facility] predicts the following parameters:    Starting Regimen: 750 mg IV every 12 hours.  AUC24,ss: 522 mg/L.hr  PAUC*: 78 %  Ctrough,ss: 18.1 mg/L  Pconc*: 40 %  Tox.: 14 %    Labs ordered: 8/10 AM vanc random and BMP to assess renal function    "

## 2022-08-09 NOTE — ED PROVIDER NOTES
"Time: 9:42 AM EDT  Arrived by: ambulance  Chief Complaint: drug overdose  History provided by: patient  History is limited by: altered mental status    History of Present Illness:  Patient is a 36 y.o. year old male who presents to the emergency department with drug overdose    Patient arrives to ED via EMS from home. Patient medical history is limited, but per EMS patient has a history of seizures.    Per EMS, the patient became unresponsive at home and someone called EMS. Patient was unresponsive on scene and was given Narcan 2mg immediately. Patient became more responsive after administration of Narcan. EMS noted that patient had a \"track sonja \"and his left arm that was still actively beating on arrival to ED.      History provided by:  EMS personnel and medical records  History limited by:  Mental status change   used: No        Similar Symptoms Previously: none  Recently seen: none      Patient Care Team  Primary Care Provider: Provider, No Known    Past Medical History:     No Known Allergies  Past Medical History:   Diagnosis Date   • Seizures (HCC)     childhood, last one at age 14   • Traumatic brain injury (HCC)     3 years ago     Past Surgical History:   Procedure Laterality Date   • BRAIN SURGERY       History reviewed. No pertinent family history.    Home Medications:  Prior to Admission medications    Not on File        Social History:   Social History     Tobacco Use   • Smoking status: Never Smoker   • Smokeless tobacco: Never Used   Substance Use Topics   • Alcohol use: Never   • Drug use: Yes     Types: IV     Comment: heroin today, reports this was first attempt     Recent travel: not applicable    Review of Systems:  Review of Systems   Unable to perform ROS: Mental status change        Physical Exam:  BP (!) 84/56 (BP Location: Left arm, Patient Position: Sitting)   Pulse (!) 139   Temp 96.6 °F (35.9 °C)   Resp (!) 39   Ht 177.8 cm (70\")   Wt 90 kg (198 lb 6.6 oz)   " SpO2 93%   BMI 28.47 kg/m²     Physical Exam  Vitals and nursing note reviewed.   Constitutional:       General: He is not in acute distress.     Comments: Patient is reactive to verbal and painful stimuli.   HENT:      Head: Normocephalic.      Comments: Patient has healed incision on top of scalp. Patient has silver spray paint on the right side of his face  Eyes:      Comments: Left eye appears a nucleated. Right eye people dilated 5mm, with slight reaction to light   Cardiovascular:      Rate and Rhythm: Tachycardia present. Rhythm irregularly irregular.   Pulmonary:      Effort: Pulmonary effort is normal. No respiratory distress.      Breath sounds: Normal breath sounds.   Abdominal:      General: Abdomen is flat. Bowel sounds are normal.      Palpations: Abdomen is soft.      Tenderness: There is no abdominal tenderness.   Musculoskeletal:         General: Normal range of motion.      Cervical back: Normal range of motion and neck supple.   Skin:     General: Skin is warm and dry.      Capillary Refill: Capillary refill takes less than 2 seconds.   Neurological:      Mental Status: He is alert and oriented to person, place, and time. Mental status is at baseline.                Medications in the Emergency Department:  Medications   sodium chloride 0.9 % flush 10 mL (has no administration in time range)   nitroglycerin (NITROSTAT) SL tablet 0.4 mg (has no administration in time range)   sodium chloride 0.9 % flush 10 mL (has no administration in time range)   sodium chloride 0.9 % flush 10 mL (has no administration in time range)   sodium chloride 0.9 % infusion 40 mL (has no administration in time range)   Enoxaparin Sodium (LOVENOX) syringe 40 mg (has no administration in time range)   sodium chloride 0.9 % infusion (has no administration in time range)   sennosides-docusate (PERICOLACE) 8.6-50 MG per tablet 2 tablet (has no administration in time range)     And   polyethylene glycol (MIRALAX) packet 17 g  (has no administration in time range)     And   bisacodyl (DULCOLAX) EC tablet 5 mg (has no administration in time range)     And   bisacodyl (DULCOLAX) suppository 10 mg (has no administration in time range)   ondansetron (ZOFRAN) tablet 4 mg (has no administration in time range)     Or   ondansetron (ZOFRAN) injection 4 mg (has no administration in time range)   dextrose (GLUTOSE) oral gel 15 g (has no administration in time range)   dextrose (D50W) (25 g/50 mL) IV injection 25 g (has no administration in time range)   glucagon (human recombinant) (GLUCAGEN DIAGNOSTIC) injection 1 mg (has no administration in time range)   Insulin Lispro (humaLOG) injection 0-14 Units (has no administration in time range)   Pharmacy to dose vancomycin (has no administration in time range)   Pharmacy to Dose Cefepime (has no administration in time range)   aluminum-magnesium hydroxide-simethicone (MAALOX MAX) 400-400-40 MG/5ML suspension 15 mL (has no administration in time range)   calcium gluconate 1g/50ml 0.675% NaCl IV SOLN (has no administration in time range)   norepinephrine (LEVOPHED) 8 mg in 250 mL NS infusion (premix) (has no administration in time range)   naloxone (NARCAN) injection 0.4 mg (has no administration in time range)   dilTIAZem (CARDIZEM) bolus from bag 1 mg/mL 10 mg (has no administration in time range)   dilTIAZem (CARDIZEM) 125 mg in 125 mL 0.7% sodium chloride  infusion (has no administration in time range)   furosemide (LASIX) injection 40 mg (has no administration in time range)   sodium chloride 0.9 % bolus 1,000 mL (0 mL Intravenous Stopped 8/9/22 1036)   vancomycin 1750 mg/500 mL 0.9% NS IVPB ([facility]) (1,750 mg Intravenous New Bag 8/9/22 1038)   sodium chloride 0.9 % bolus 2,619 mL (0 mL/kg × 87.3 kg Intravenous Stopped 8/9/22 1121)   cefepime (MAXIPIME) IVPB 2 g (premix) in D5 (0 g Intravenous Stopped 8/9/22 1121)   dextrose (D50W) 50 % (25 g/50 mL) IV injection  - ADS Override Pull (  Given 8/9/22  1133)   Naloxone HCl (NARCAN) injection 2 mg (2 mg Intravenous Given 8/9/22 1136)        Labs  Lab Results (last 24 hours)     Procedure Component Value Units Date/Time    CBC & Differential [[id number]]  (Abnormal) Collected: 08/09/22 0936    Specimen: Blood Updated: 08/09/22 0943    Narrative:      The following orders were created for panel order CBC & Differential.  Procedure                               Abnormality         Status                     ---------                               -----------         ------                     CBC Auto Differential[[id number]]        Abnormal            Final result                 Please view results for these tests on the individual orders.    Comprehensive Metabolic Panel [[id number]]  (Abnormal) Collected: 08/09/22 0936    Specimen: Blood Updated: 08/09/22 1004     Glucose 233 mg/dL      BUN 18 mg/dL      Creatinine 1.98 mg/dL      Sodium 143 mmol/L      Potassium 3.9 mmol/L      Chloride 104 mmol/L      CO2 26.1 mmol/L      Calcium 9.4 mg/dL      Total Protein 7.5 g/dL      Albumin 4.50 g/dL      ALT (SGPT) 35 U/L      AST (SGOT) 43 U/L      Alkaline Phosphatase 80 U/L      Total Bilirubin 1.5 mg/dL      Globulin 3.0 gm/dL      A/G Ratio 1.5 g/dL      BUN/Creatinine Ratio 9.1     Anion Gap 12.9 mmol/L      eGFR 44.6 mL/min/1.73      Comment: National Kidney Foundation and American Society of Nephrology (ASN) Task Force recommended calculation based on the Chronic Kidney Disease Epidemiology Collaboration (CKD-EPI) equation refit without adjustment for race.       Narrative:      GFR Normal >60  Chronic Kidney Disease <60  Kidney Failure <15      Acetaminophen Level [[id number]]  (Normal) Collected: 08/09/22 0936    Specimen: Blood Updated: 08/09/22 1004     Acetaminophen <5.0 mcg/mL     Ethanol [[id number]] Collected: 08/09/22 0936    Specimen: Blood Updated: 08/09/22 1004     Ethanol <10 mg/dL      Ethanol % <0.010 %     Narrative:      Ethanol (Plasma)  <10  Essentially Negative    Toxic Concentrations           mg/dL    Flushing, slowing of reflexes    Impaired visual activity         Depression of CNS              >100  Possible Coma                  >300       Salicylate Level [[id number]]  (Normal) Collected: 08/09/22 0936    Specimen: Blood Updated: 08/09/22 1004     Salicylate <0.3 mg/dL     CBC Auto Differential [[id number]]  (Abnormal) Collected: 08/09/22 0936    Specimen: Blood Updated: 08/09/22 0943     WBC 11.61 10*3/mm3      RBC 6.16 10*6/mm3      Hemoglobin 16.8 g/dL      Hematocrit 54.4 %      MCV 88.3 fL      MCH 27.3 pg      MCHC 30.9 g/dL      RDW 13.2 %      RDW-SD 42.7 fl      MPV 9.1 fL      Platelets 440 10*3/mm3      Neutrophil % 73.1 %      Lymphocyte % 17.4 %      Monocyte % 3.9 %      Eosinophil % 0.7 %      Basophil % 0.7 %      Immature Grans % 4.2 %      Neutrophils, Absolute 8.49 10*3/mm3      Lymphocytes, Absolute 2.02 10*3/mm3      Monocytes, Absolute 0.45 10*3/mm3      Eosinophils, Absolute 0.08 10*3/mm3      Basophils, Absolute 0.08 10*3/mm3      Immature Grans, Absolute 0.49 10*3/mm3      nRBC 0.0 /100 WBC     TSH [[id number]]  (Normal) Collected: 08/09/22 0936    Specimen: Blood Updated: 08/09/22 1114     TSH 2.090 uIU/mL     T4, Free [[id number]]  (Normal) Collected: 08/09/22 0936    Specimen: Blood Updated: 08/09/22 1115     Free T4 1.18 ng/dL     Narrative:      Results may be falsely increased if patient taking Biotin.      BNP [[id number]]  (Normal) Collected: 08/09/22 0936    Specimen: Blood Updated: 08/09/22 1126     proBNP 21.7 pg/mL     Narrative:      Among patients with dyspnea, NT-proBNP is highly sensitive for the detection of acute congestive heart failure. In addition NT-proBNP of <300 pg/ml effectively rules out acute congestive heart failure with 99% negative predictive value.    Results may be falsely decreased if patient taking Biotin.      CK [[id number]]  (Abnormal) Collected: 08/09/22 0936    Specimen: Blood  Updated: 08/09/22 1150     Creatine Kinase 240 U/L     Troponin [[id number]]  (Normal) Collected: 08/09/22 0936    Specimen: Blood Updated: 08/09/22 1149     Troponin T 0.028 ng/mL     Narrative:      Troponin T Reference Range:  <= 0.03 ng/mL-   Negative for AMI  >0.03 ng/mL-     Abnormal for myocardial necrosis.  Clinicians would have to utilize clinical acumen, EKG, Troponin and serial changes to determine if it is an Acute Myocardial Infarction or myocardial injury due to an underlying chronic condition.       Results may be falsely decreased if patient taking Biotin.      HIV-1 & HIV-2 Antibodies [[id number]] Collected: 08/09/22 0936    Specimen: Blood Updated: 08/09/22 1229    POC Glucose Once [[id number]]  (Abnormal) Collected: 08/09/22 0943    Specimen: Blood Updated: 08/09/22 0944     Glucose 217 mg/dL      Comment: Serial Number: [id number]:  [id number]       ABG with Co-Ox and Electrolytes [[id number]]  (Abnormal) Collected: 08/09/22 0945    Specimen: Arterial Blood Updated: 08/09/22 0949     pH, Arterial 7.165 pH units      pCO2, Arterial 52.2 mm Hg      pO2, Arterial <40.5 mm Hg      HCO3, Arterial 18.4 mmol/L      Base Excess, Arterial -10.7 mmol/L      O2 Saturation, Arterial 69.9 %      Hemoglobin, Blood Gas 18.2 g/dL      Carboxyhemoglobin 0.4 %      Methemoglobin 0.10 %      Oxyhemoglobin 69.6 %      FHHB 29.9 %      Lyle's Test Negative     Note --     Site Arterial: right radial     Modality Room Air     FIO2 21 %      Flow Rate 0 lpm      Sodium, Arterial 143.1 mmol/L      Potassium, Arterial 3.87 mmol/L      Ionized Calcium, Arterial 1.21 mmol/L      Chloride, Arterial 106 mmol/L      Glucose, Arterial 172 mmol/L      Lactate, Arterial 5.27 mmol/L      PO2/FIO2 <193    Urine Drug Screen - Urine, Catheter [[id number]]  (Abnormal) Collected: 08/09/22 0954    Specimen: Urine, Catheter Updated: 08/09/22 1026     Amphet/Methamphet, Screen Positive     Barbiturates Screen, Urine Negative      Benzodiazepine Screen, Urine Negative     Cocaine Screen, Urine Negative     Opiate Screen Positive     THC, Screen, Urine Negative     Methadone Screen, Urine Negative     Oxycodone Screen, Urine Negative    Narrative:      Negative Thresholds Per Drugs Screened:    Amphetamines                 500 ng/ml  Barbiturates                 200 ng/ml  Benzodiazepines              100 ng/ml  Cocaine                      300 ng/ml  Methadone                    300 ng/ml  Opiates                      300 ng/ml  Oxycodone                    100 ng/ml  THC                           50 ng/ml    The Normal Value for all drugs tested is negative. This report includes final unconfirmed screening results to be used for medical treatment purposes only. Unconfirmed results must not be used for non-medical purposes such as employment or legal testing. Clinical consideration should be applied to any drug of abuse test, particularly when unconfirmed results are used.            Blood Culture - Blood, Arm, Left [[id number]] Collected: 08/09/22 0954    Specimen: Blood from Arm, Left Updated: 08/09/22 1000    Lactic Acid, Plasma [[id number]]  (Abnormal) Collected: 08/09/22 0954    Specimen: Blood Updated: 08/09/22 1041     Lactate 5.8 mmol/L     Blood Gas, Arterial -With Co-Ox Panel: Yes [[id number]]  (Abnormal) Collected: 08/09/22 0959    Specimen: Arterial Blood from Arm, Right Updated: 08/09/22 1005     pH, Arterial 7.196 pH units      pCO2, Arterial 53.5 mm Hg      pO2, Arterial <40.5 mm Hg      HCO3, Arterial 20.3 mmol/L      Base Excess, Arterial -8.5 mmol/L      O2 Saturation, Arterial 64.8 %      Hemoglobin, Blood Gas 18.6 g/dL      Carboxyhemoglobin 0.4 %      Methemoglobin 0.30 %      Oxyhemoglobin 64.3 %      FHHB 35.0 %      Site Arterial: right radial     Modality RA     FIO2 21 %      PO2/FIO2 <193     Lyle's Test Positive     Lactate, Arterial --    Blood Culture - Blood, Arm, Left [[id number]] Collected: 08/09/22 1038     Specimen: Blood from Arm, Left Updated: 08/09/22 1044    COVID-19,APTIMA PANTHER([facility]),[facility]/ [facility], NP/OP SWAB IN UTM/VTM/[facility] TRANSPORT MEDIA,24 HR TAT - Swab, Nasopharynx [[id number]] Collected: 08/09/22 1044    Specimen: Swab from Nasopharynx Updated: 08/09/22 1047    ABG with Co-Ox and Electrolytes [[id number]]  (Abnormal) Collected: 08/09/22 1115    Specimen: Arterial Blood Updated: 08/09/22 1123     pH, Arterial 7.146 pH units      pCO2, Arterial 59.9 mm Hg      pO2, Arterial 61.8 mm Hg      HCO3, Arterial 20.2 mmol/L      Base Excess, Arterial -9.5 mmol/L      O2 Saturation, Arterial 89.4 %      Hemoglobin, Blood Gas 15.0 g/dL      Carboxyhemoglobin 0.5 %      Methemoglobin 0.20 %      Oxyhemoglobin 88.8 %      FHHB 10.5 %      Lyle's Test Positive     Note AIRVO 50lpm/100%     Site Arterial: right radial     Modality Heated HFNC     FIO2 100 %      Flow Rate 50 lpm      Sodium, Arterial 142.3 mmol/L      Potassium, Arterial 3.33 mmol/L      Ionized Calcium, Arterial 1.01 mmol/L      Chloride, Arterial 115 mmol/L      Glucose, Arterial 53 mmol/L      Lactate, Arterial 1.46 mmol/L      PO2/FIO2 62    POC Glucose Once [[id number]]  (Abnormal) Collected: 08/09/22 1127    Specimen: Blood Updated: 08/09/22 1142     Glucose 55 mg/dL      Comment: Serial Number: [id number]:  [id number]       POC Glucose Once [[id number]]  (Abnormal) Collected: 08/09/22 1144    Specimen: Blood Updated: 08/09/22 1144     Glucose 126 mg/dL      Comment: Serial Number: [id number]:  [id number]              Imaging:  CT Head Without Contrast    Result Date: 8/9/2022  PROCEDURE: CT HEAD WO CONTRAST  COMPARISON:  None INDICATIONS: FOUND DOWN,POSS. OVERDOSE  PROTOCOL:   Standard imaging protocol performed    RADIATION:   DLP: 1238.2mGy*cm   MA and/or KV was adjusted to minimize radiation dose.     TECHNIQUE: After obtaining the patient's consent, CT images were obtained without non-ionic intravenous contrast material.  FINDINGS:   There has been a previous bifrontal craniotomy.  There is moderate encephalomalacia in the anteroinferior frontal lobes bilaterally, suspected to be posttraumatic and or postsurgical in etiology.  No acute infarction, mass or intracranial hemorrhage is seen.  The ventricles are normal in size and morphology.  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  There are chronic appearing facial fractures.  No acute fractures identified.        1. No acute fracture or intracranial hemorrhage 2. Chronic facial fractures, as above 3. Chronic encephalomalacia in the anteroinferior frontal lobes bilaterally, likely posttraumatic and or post surgical in etiology.     Prmio Andrews M.D.       Electronically Signed and Approved By: Primo Andrews M.D. on 8/09/2022 at 11:02             CT Chest Without Contrast Diagnostic    Result Date: 8/9/2022  PROCEDURE: CT CHEST WO CONTRAST DIAGNOSTIC  COMPARISON: None  INDICATIONS: hypoxia,FOUND DOWN,POSS. OVERDOSE,PT. UNABLE TO RAISE BOTH ARMS ABOVE HEAD  PROTOCOL:   Standard imaging protocol performed    RADIATION:   DLP: 534.6mGy*cm   Automated exposure control was utilized to minimize radiation dose.  TECHNIQUE: Axial images of the chest without intravenous contrast.  FINDINGS: There is motion artifact.  No evidence of pneumothorax.  No evidence of pleural or pericardial effusion.  The thoracic aorta has a normal caliber.  There is no mediastinal, axillary or hilar adenopathy.  Images of the upper abdomen have a grossly normal unenhanced appearance.  No acute osseous abnormalities are identified.  There is extensive airspace consolidation throughout the lung fields.  IMPRESSION:  1. Motion artifact.  2. Extensive airspace consolidation throughout the lung fields possibly secondary to ARDS, multifocal pneumonia or pulmonary edema.   SHELBI CARMICHAEL MD       Electronically Signed and Approved By: SHELBI CARMICHAEL MD on 8/09/2022 at 10:47             XR Chest 1 View    Result Date:  8/9/2022  PROCEDURE: XR CHEST 1 VW  COMPARISON: None  INDICATIONS: COUGH TODAY; SUSPECT SPRAY PAINT INHALATION  FINDINGS:   The lungs are well-expanded. The heart and pulmonary vasculature are within normal limits. No pleural effusions are identified. There is extensive airspace opacity in the lung fields.  No evidence of pneumothorax.  IMPRESSION:  Extensive airspace opacity in the lung fields possibly secondary to ARDS, pulmonary edema or multifocal pneumonia.  SHELBI CARMICHAEL MD       Electronically Signed and Approved By: SHELBI CARMICHAEL MD on 8/09/2022 at 10:23               Procedures:  Critical Care  Performed by: Gregory Estevez DO  Authorized by: Vidya Quevedo DO     Critical care provider statement:     Critical care time (minutes):  45    Critical care was necessary to treat or prevent imminent or life-threatening deterioration of the following conditions:  CNS failure or compromise, respiratory failure and sepsis    Critical care was time spent personally by me on the following activities:  Discussions with consultants, evaluation of patient's response to treatment, examination of patient, ordering and performing treatments and interventions, ordering and review of laboratory studies, ordering and review of radiographic studies, pulse oximetry, re-evaluation of patient's condition and review of old charts        Progress                            Medical Decision Making:  MDM   36-year-old male patient presented with altered mental status.  Patient does not have accessible old records to obtain a history.  The patient is responsive to painful stimuli.  He was given 2 mg of Narcan in route with some improvement.  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  The patient has silver spray pain covering the right side of his face and head suggesting huffing.  Patient's urine drug screen was positive for opiates and methamphetamine.  Patient was hypoxic  and discussed with Dr. Melendrez, critical care, who recommended placing the patient in air Vo.  Patient also received IV fluids and IV antibiotics for possible sepsis.  His rectal temp was hypothermic and he was placed on a Jaz hugger.  Patient received IV fluids.  Head CT did not show any acute injury.  Patient will be admitted to the hospital service and is going to the ICU.            Final diagnoses:   Altered mental status, unspecified altered mental status type   Substance abuse (HCC)   Hypoxia   Multifocal pneumonia   Sepsis, due to unspecified organism, unspecified whether acute organ dysfunction present (HCC)   Atrial fibrillation with rapid ventricular response (HCC)        Disposition:  ED Disposition     ED Disposition   Decision to Admit    Condition   --    Comment   Level of Care: Critical Care [6]   Diagnosis: AMS (altered mental status) [[id number]]   Admitting Physician: MILTON GIBSON [[id number]]   Attending Physician: MILTON GIBSON [[id number]]   Isolate for COVID?: No [0]   Certification: I Certify That Inpatient Hospital Services Are Medically Necessary For Greater Than 2 Midnights               This medical record created using voice recognition software and a virtual scribe.    Documentation assistance provided by Ava Da Silva acting as scribe for Gregory Estevez DO. Information recorded by the scribe was done at my direction and has been verified and validated by me.       Ava Da Silva  08/09/22 0958       Gregory Estevez DO  08/09/22 5614

## 2022-08-09 NOTE — H&P
[facility] HISTORY AND PHYSICAL  Date: 2022   Patient Name: Gregory Miller  : 1985  MRN: [id number]  Primary Care Physician:  Provider, No Known  Date of admission: 2022    Subjective Drug overdose  Subjective     Chief Complaint: Drug overdose    HPI: Patient is a 36-year-old man that presents with drug overdose.  He arrived via EMS from home.  Patient was found unresponsive.  He was given 2 of Narcan immediately.  He was more responsive after the Narcan.  The EMS noticed that there are track marks on his left arm.  Patient acknowledges using heroin today.    On arrival to the ED, patient had a temperature of 93.4, pulse of 143, respiratory rate of 26, blood pressure 110/99, and he was saturating 81%.  Patient eventually was placed on a high flow nasal cannula mask and was saturating 93%.    Initial ABG showed: 7.1/52.2/less than 40/18.4.  His lactate was 5.8.  Patient's troponin was negative.  HIV negative.  Creatinine kinase was 240.  proBNP was 21.7.  Thyroid function was normal.  His white blood cell count is 11.61.  Ethanol was negative.  Leukos was 233.  Creatinine is 1.98.  Tox screen was positive for methamphetamine and opiates.    Head CT shows no acute fracture or intracranial hemorrhage.  Chronic facial fractures.  Chronic encephalomalacia in the anterior inferior frontal lobes bilaterally likely posttraumatic.  CT chest shows motion artifact.  Extensive airspace consolidation through the lung fields possibly secondary to arts, multifocal pneumonia or pulmonary edema.    Personal History     Past Medical History:  Past Medical History:   Diagnosis Date   • Seizures (HCC)     childhood, last one at age 14   • Traumatic brain injury (HCC)     3 years ago         Past Surgical History:  Past Surgical History:   Procedure Laterality Date   • BRAIN SURGERY         Family History:   Breast Cancer-related family history is not on file.      Social History:    Social History     Socioeconomic History   • Marital status: Unknown   Tobacco Use   • Smoking status: Never Smoker   • Smokeless tobacco: Never Used   Substance and Sexual Activity   • Alcohol use: Never   • Drug use: Yes     Types: IV     Comment: heroin today, reports this was first attempt   • Sexual activity: Defer         Home Medications:       Allergies:  No Known Allergies    Review of Systems   All systems were reviewed and negative except for: Unable to get because of mentation    Objective   Objective     Vitals:   Temp:  [93.4 °F (34.1 °C)-96.6 °F (35.9 °C)] 96.6 °F (35.9 °C)  Heart Rate:  [113-152] 139  Resp:  [26-39] 39  BP: ()/() 84/56  Flow (L/min):  [50] 50    Physical Exam    Constitutional: Somnolent on high flow   Eyes: Pupils equal, sclerae anicteric, no conjunctival injection   HENT: NCAT, mucous membranes moist   Neck: Supple, no thyromegaly, no lymphadenopathy, trachea midline   Respiratory: Coarse breath sounds   Cardiovascular: Irregularly irregular   Gastrointestinal: Positive bowel sounds, soft, nontender, nondistended   Musculoskeletal: No bilateral ankle edema, no clubbing or cyanosis to extremities   Psychiatric: Appropriate affect, cooperative   Neurologic: Oriented x 3, strength symmetric in all extremities, Cranial Nerves grossly intact to confrontation, speech clear   Skin: No rashes     Result Review    Result Review:  I have personally reviewed the results from the time of this admission to 8/9/2022 11:50 EDT and agree with these findings:  [x]  Laboratory  []  Microbiology  [x]  Radiology  []  EKG/Telemetry   []  Cardiology/Vascular   []  Pathology  [x]  Old records  []  Other:      Assessment & Plan   Assessment / Plan   #1 drug overdose  -Patient acknowledges using heroin today.  Tox screen positive for opiates and methamphetamine.  -As needed Narcan  -CIWA if needed  -HIV negative; hepatitis screen pending    #2 acute respiratory failure with hypoxia and  hypercapnia secondary to possible ards/pulmonary edema  -On high flow nasal cannula  -40 of IV Lasix every 12 hours  -Pulmonology consulted  -Nebulizers, bronchopulmonary hygiene    #3 concern for sepsis  -Evidenced by hypotension, tachycardia, hypothermia, leukocytosis  -Empirically started on cefepime and vancomycin.  Pharmacy to dose.  -Follow blood cultures, respiratory panel, strep, Legionella, COVID, procalcitonin.  -Levophed if needed    #4 new onset A. Fib  -Check electrolytes  -Cardizem bolus and drip  -Echo ordered  -We will need to start on DOAC or ACS dose heparin    #5 INDIO  -Should improve with received IV fluids    #6 hyperglycemia/hypoglycemia  -Insulin sliding scale  -hypoglycemia protocol    #7 history of seizures  -As needed Ativan ordered    #8 low calcium replete    DVT prophylaxis:  Medical DVT prophylaxis orders are present.    CODE STATUS:    Code Status (Patient has no pulse and is not breathing): CPR (Attempt to Resuscitate)  Medical Interventions (Patient has pulse or is breathing): Full Support  Release to patient: Routine Release      Admission Status:  I believe this patient meets inpatient status.  Patient is critically ill 37 minutes of critical care time.    Electronically signed by Vidya Quevedo DO, 08/09/22, 11:50 AM EDT.

## 2022-08-09 NOTE — PROCEDURES
"Intubation    Date/Time: 8/9/2022 4:24 PM  Performed by: Jose Melendrez DO  Authorized by: Jose Melendrez DO   Consent: Verbal consent obtained.  Risks and benefits: risks, benefits and alternatives were discussed  Consent given by: patient  Patient understanding: patient states understanding of the procedure being performed  Patient consent: the patient's understanding of the procedure matches consent given  Procedure consent: procedure consent matches procedure scheduled  Relevant documents: relevant documents present and verified  Test results: test results available and properly labeled  Site marked: the operative site was marked  Imaging studies: imaging studies available  Patient identity confirmed: arm band  Time out: Immediately prior to procedure a \"time out\" was called to verify the correct patient, procedure, equipment, support staff and site/side marked as required.  Indications: respiratory distress  Intubation method: video-assisted  Patient status: paralyzed (RSI)  Sedatives: etomidate  Paralytic: rocuronium  Laryngoscope size: Mac 4  Tube size: 8.0 mm  Tube type: cuffed  Number of attempts: 1  Cricoid pressure: yes  Cords visualized: yes  Post-procedure assessment: chest rise  Breath sounds: equal  Cuff inflated: yes  ETT to lip: 24 cm  Patient tolerance: patient tolerated the procedure well with no immediate complications        "

## 2022-08-09 NOTE — PLAN OF CARE
Goal Outcome Evaluation:  Pt admitted for heroin overdose. Arrived from ED on airvo, pt continued to desat and refuse to wear bipap, ultimately patient required mechanical intubation. Pt currently on levo, prop, and heparin.

## 2022-08-10 ENCOUNTER — APPOINTMENT (OUTPATIENT)
Dept: CARDIOLOGY | Facility: HOSPITAL | Age: 37
End: 2022-08-10

## 2022-08-10 LAB
ANION GAP SERPL CALCULATED.3IONS-SCNC: 8.9 MMOL/L (ref 5–15)
APTT PPP: 173.7 SECONDS (ref 24.2–34.2)
APTT PPP: 55.3 SECONDS (ref 24.2–34.2)
BASOPHILS # BLD AUTO: 0.1 10*3/MM3 (ref 0–0.2)
BASOPHILS NFR BLD AUTO: 0.7 % (ref 0–1.5)
BH CV ECHO MEAS - EF(MOD-BP): 62.6 %
BH CV ECHO MEAS - IVSD: 1.5 CM
BH CV ECHO MEAS - LA DIMENSION: 2.3 CM
BH CV ECHO MEAS - LAT PEAK E' VEL: 10.1 CM/SEC
BH CV ECHO MEAS - LVIDD: 3.9 CM
BH CV ECHO MEAS - LVIDS: 2.4 CM
BH CV ECHO MEAS - LVOT DIAM: 2.2 CM
BH CV ECHO MEAS - LVPWD: 1.3 CM
BH CV ECHO MEAS - MED PEAK E' VEL: 9.9 CM/SEC
BH CV ECHO MEAS - MV A MAX VEL: 64.4 CM/SEC
BH CV ECHO MEAS - MV DEC TIME: 153 MSEC
BH CV ECHO MEAS - MV E MAX VEL: 51.7 CM/SEC
BH CV ECHO MEAS - MV E/A: 0.8
BH CV ECHO MEASUREMENTS AVERAGE E/E' RATIO: 5.17
BUN SERPL-MCNC: 19 MG/DL (ref 6–20)
BUN/CREAT SERPL: 13.1 (ref 7–25)
CA-I BLDA-SCNC: 1.01 MMOL/L (ref 1.13–1.32)
CALCIUM SPEC-SCNC: 7.7 MG/DL (ref 8.6–10.5)
CHLORIDE SERPL-SCNC: 106 MMOL/L (ref 98–107)
CO2 SERPL-SCNC: 26.1 MMOL/L (ref 22–29)
CREAT SERPL-MCNC: 1.45 MG/DL (ref 0.76–1.27)
DEPRECATED RDW RBC AUTO: 42 FL (ref 37–54)
EGFRCR SERPLBLD CKD-EPI 2021: 64.8 ML/MIN/1.73
EOSINOPHIL # BLD AUTO: 0.12 10*3/MM3 (ref 0–0.4)
EOSINOPHIL NFR BLD AUTO: 0.9 % (ref 0.3–6.2)
ERYTHROCYTE [DISTWIDTH] IN BLOOD BY AUTOMATED COUNT: 13.5 % (ref 12.3–15.4)
GLUCOSE BLDC GLUCOMTR-MCNC: 146 MG/DL (ref 70–99)
GLUCOSE BLDC GLUCOMTR-MCNC: 63 MG/DL (ref 70–99)
GLUCOSE BLDC GLUCOMTR-MCNC: 69 MG/DL (ref 70–99)
GLUCOSE BLDC GLUCOMTR-MCNC: 75 MG/DL (ref 70–99)
GLUCOSE BLDC GLUCOMTR-MCNC: 84 MG/DL (ref 70–99)
GLUCOSE SERPL-MCNC: 93 MG/DL (ref 65–99)
HCT VFR BLD AUTO: 44.7 % (ref 37.5–51)
HGB BLD-MCNC: 14.7 G/DL (ref 13–17.7)
IMM GRANULOCYTES # BLD AUTO: 0.07 10*3/MM3 (ref 0–0.05)
IMM GRANULOCYTES NFR BLD AUTO: 0.5 % (ref 0–0.5)
IVRT: 53 MSEC
LEFT ATRIUM VOLUME INDEX: 8.2 ML/M2
LYMPHOCYTES # BLD AUTO: 2.1 10*3/MM3 (ref 0.7–3.1)
LYMPHOCYTES NFR BLD AUTO: 14.9 % (ref 19.6–45.3)
MAGNESIUM SERPL-MCNC: 1.4 MG/DL (ref 1.6–2.6)
MAXIMAL PREDICTED HEART RATE: 186 BPM
MCH RBC QN AUTO: 27.6 PG (ref 26.6–33)
MCHC RBC AUTO-ENTMCNC: 32.9 G/DL (ref 31.5–35.7)
MCV RBC AUTO: 83.9 FL (ref 79–97)
MONOCYTES # BLD AUTO: 0.58 10*3/MM3 (ref 0.1–0.9)
MONOCYTES NFR BLD AUTO: 4.1 % (ref 5–12)
NEUTROPHILS NFR BLD AUTO: 11.11 10*3/MM3 (ref 1.7–7)
NEUTROPHILS NFR BLD AUTO: 78.9 % (ref 42.7–76)
NRBC BLD AUTO-RTO: 0 /100 WBC (ref 0–0.2)
PHOSPHATE SERPL-MCNC: 4.1 MG/DL (ref 2.5–4.5)
PLATELET # BLD AUTO: 294 10*3/MM3 (ref 140–450)
PMV BLD AUTO: 9 FL (ref 6–12)
POTASSIUM SERPL-SCNC: 4.2 MMOL/L (ref 3.5–5.2)
RBC # BLD AUTO: 5.33 10*6/MM3 (ref 4.14–5.8)
SODIUM SERPL-SCNC: 141 MMOL/L (ref 136–145)
STRESS TARGET HR: 158 BPM
VANCOMYCIN SERPL-MCNC: 11.2 MCG/ML (ref 5–40)
WBC NRBC COR # BLD: 14.08 10*3/MM3 (ref 3.4–10.8)

## 2022-08-10 PROCEDURE — 84100 ASSAY OF PHOSPHORUS: CPT | Performed by: HOSPITALIST

## 2022-08-10 PROCEDURE — 99291 CRITICAL CARE FIRST HOUR: CPT | Performed by: INTERNAL MEDICINE

## 2022-08-10 PROCEDURE — 80048 BASIC METABOLIC PNL TOTAL CA: CPT | Performed by: HOSPITALIST

## 2022-08-10 PROCEDURE — 25010000002 CALCIUM GLUCONATE 2-0.675 GM/100ML-% SOLUTION: Performed by: PHYSICIAN ASSISTANT

## 2022-08-10 PROCEDURE — 87071 CULTURE AEROBIC QUANT OTHER: CPT | Performed by: INTERNAL MEDICINE

## 2022-08-10 PROCEDURE — 25010000002 PROPOFOL 10 MG/ML EMULSION: Performed by: INTERNAL MEDICINE

## 2022-08-10 PROCEDURE — 25010000002 VANCOMYCIN 5 G RECONSTITUTED SOLUTION: Performed by: HOSPITALIST

## 2022-08-10 PROCEDURE — 82330 ASSAY OF CALCIUM: CPT | Performed by: PHYSICIAN ASSISTANT

## 2022-08-10 PROCEDURE — 25010000002 SULFUR HEXAFLUORIDE MICROSPH 60.7-25 MG RECONSTITUTED SUSPENSION: Performed by: INTERNAL MEDICINE

## 2022-08-10 PROCEDURE — 0B9F8ZX DRAINAGE OF RIGHT LOWER LUNG LOBE, VIA NATURAL OR ARTIFICIAL OPENING ENDOSCOPIC, DIAGNOSTIC: ICD-10-PCS | Performed by: INTERNAL MEDICINE

## 2022-08-10 PROCEDURE — 94799 UNLISTED PULMONARY SVC/PX: CPT

## 2022-08-10 PROCEDURE — 87633 RESP VIRUS 12-25 TARGETS: CPT | Performed by: INTERNAL MEDICINE

## 2022-08-10 PROCEDURE — 93306 TTE W/DOPPLER COMPLETE: CPT | Performed by: SPECIALIST

## 2022-08-10 PROCEDURE — 83735 ASSAY OF MAGNESIUM: CPT | Performed by: HOSPITALIST

## 2022-08-10 PROCEDURE — 94003 VENT MGMT INPAT SUBQ DAY: CPT

## 2022-08-10 PROCEDURE — 0 MAGNESIUM SULFATE 4 GM/100ML SOLUTION: Performed by: HOSPITALIST

## 2022-08-10 PROCEDURE — 25010000002 CEFEPIME PER 500 MG: Performed by: HOSPITALIST

## 2022-08-10 PROCEDURE — 25010000002 FUROSEMIDE PER 20 MG: Performed by: PHYSICIAN ASSISTANT

## 2022-08-10 PROCEDURE — 87205 SMEAR GRAM STAIN: CPT | Performed by: INTERNAL MEDICINE

## 2022-08-10 PROCEDURE — 93306 TTE W/DOPPLER COMPLETE: CPT

## 2022-08-10 PROCEDURE — 80202 ASSAY OF VANCOMYCIN: CPT | Performed by: HOSPITALIST

## 2022-08-10 PROCEDURE — 85025 COMPLETE CBC W/AUTO DIFF WBC: CPT | Performed by: HOSPITALIST

## 2022-08-10 PROCEDURE — 85730 THROMBOPLASTIN TIME PARTIAL: CPT | Performed by: INTERNAL MEDICINE

## 2022-08-10 PROCEDURE — 31624 DX BRONCHOSCOPE/LAVAGE: CPT | Performed by: INTERNAL MEDICINE

## 2022-08-10 PROCEDURE — 25010000002 MAGNESIUM SULFATE 2 GM/50ML SOLUTION: Performed by: PHYSICIAN ASSISTANT

## 2022-08-10 PROCEDURE — 82962 GLUCOSE BLOOD TEST: CPT

## 2022-08-10 RX ORDER — CLONAZEPAM 0.5 MG/1
1 TABLET ORAL EVERY 8 HOURS
Status: DISCONTINUED | OUTPATIENT
Start: 2022-08-11 | End: 2022-08-11

## 2022-08-10 RX ORDER — ONDANSETRON 2 MG/ML
4 INJECTION INTRAMUSCULAR; INTRAVENOUS EVERY 6 HOURS PRN
Status: DISCONTINUED | OUTPATIENT
Start: 2022-08-10 | End: 2022-08-12 | Stop reason: HOSPADM

## 2022-08-10 RX ORDER — CLONAZEPAM 0.5 MG/1
1 TABLET ORAL EVERY 8 HOURS
Status: DISCONTINUED | OUTPATIENT
Start: 2022-08-10 | End: 2022-08-10

## 2022-08-10 RX ORDER — CALCIUM GLUCONATE 20 MG/ML
2 INJECTION, SOLUTION INTRAVENOUS ONCE
Status: COMPLETED | OUTPATIENT
Start: 2022-08-10 | End: 2022-08-10

## 2022-08-10 RX ORDER — ACETAMINOPHEN 325 MG/1
650 TABLET ORAL EVERY 6 HOURS PRN
Status: DISCONTINUED | OUTPATIENT
Start: 2022-08-10 | End: 2022-08-10

## 2022-08-10 RX ORDER — ENOXAPARIN SODIUM 100 MG/ML
40 INJECTION SUBCUTANEOUS EVERY 24 HOURS
Status: DISCONTINUED | OUTPATIENT
Start: 2022-08-11 | End: 2022-08-12 | Stop reason: HOSPADM

## 2022-08-10 RX ORDER — SODIUM CHLORIDE, SODIUM LACTATE, POTASSIUM CHLORIDE, CALCIUM CHLORIDE 600; 310; 30; 20 MG/100ML; MG/100ML; MG/100ML; MG/100ML
100 INJECTION, SOLUTION INTRAVENOUS CONTINUOUS
Status: DISCONTINUED | OUTPATIENT
Start: 2022-08-10 | End: 2022-08-11

## 2022-08-10 RX ORDER — AMOXICILLIN 250 MG
2 CAPSULE ORAL 2 TIMES DAILY
Status: DISCONTINUED | OUTPATIENT
Start: 2022-08-10 | End: 2022-08-12 | Stop reason: HOSPADM

## 2022-08-10 RX ORDER — CEFEPIME 1 G/50ML
2 INJECTION, SOLUTION INTRAVENOUS EVERY 8 HOURS
Status: DISCONTINUED | OUTPATIENT
Start: 2022-08-10 | End: 2022-08-11 | Stop reason: ALTCHOICE

## 2022-08-10 RX ORDER — POLYETHYLENE GLYCOL 3350 17 G/17G
17 POWDER, FOR SOLUTION ORAL DAILY PRN
Status: DISCONTINUED | OUTPATIENT
Start: 2022-08-10 | End: 2022-08-12 | Stop reason: HOSPADM

## 2022-08-10 RX ORDER — ALUMINA, MAGNESIA, AND SIMETHICONE 2400; 2400; 240 MG/30ML; MG/30ML; MG/30ML
15 SUSPENSION ORAL EVERY 6 HOURS PRN
Status: DISCONTINUED | OUTPATIENT
Start: 2022-08-10 | End: 2022-08-12 | Stop reason: HOSPADM

## 2022-08-10 RX ORDER — MAGNESIUM SULFATE HEPTAHYDRATE 40 MG/ML
2 INJECTION, SOLUTION INTRAVENOUS ONCE
Status: COMPLETED | OUTPATIENT
Start: 2022-08-10 | End: 2022-08-10

## 2022-08-10 RX ORDER — ONDANSETRON 4 MG/1
4 TABLET, FILM COATED ORAL EVERY 6 HOURS PRN
Status: DISCONTINUED | OUTPATIENT
Start: 2022-08-10 | End: 2022-08-12 | Stop reason: HOSPADM

## 2022-08-10 RX ORDER — BISACODYL 10 MG
10 SUPPOSITORY, RECTAL RECTAL DAILY PRN
Status: DISCONTINUED | OUTPATIENT
Start: 2022-08-10 | End: 2022-08-12 | Stop reason: HOSPADM

## 2022-08-10 RX ORDER — ACETAMINOPHEN 325 MG/1
650 TABLET ORAL EVERY 6 HOURS PRN
Status: DISCONTINUED | OUTPATIENT
Start: 2022-08-10 | End: 2022-08-12 | Stop reason: HOSPADM

## 2022-08-10 RX ORDER — MAGNESIUM SULFATE HEPTAHYDRATE 40 MG/ML
4 INJECTION, SOLUTION INTRAVENOUS ONCE
Status: COMPLETED | OUTPATIENT
Start: 2022-08-10 | End: 2022-08-10

## 2022-08-10 RX ADMIN — FUROSEMIDE 40 MG: 10 INJECTION, SOLUTION INTRAMUSCULAR; INTRAVENOUS at 00:24

## 2022-08-10 RX ADMIN — Medication 0.04 MCG/KG/MIN: at 12:25

## 2022-08-10 RX ADMIN — CALCIUM GLUCONATE 2 G: 20 INJECTION, SOLUTION INTRAVENOUS at 10:54

## 2022-08-10 RX ADMIN — CEFEPIME 2 G: 1 INJECTION, SOLUTION INTRAVENOUS at 23:13

## 2022-08-10 RX ADMIN — VANCOMYCIN HYDROCHLORIDE 1000 MG: 5 INJECTION, POWDER, LYOPHILIZED, FOR SOLUTION INTRAVENOUS at 08:28

## 2022-08-10 RX ADMIN — SENNOSIDES AND DOCUSATE SODIUM 2 TABLET: 50; 8.6 TABLET ORAL at 08:28

## 2022-08-10 RX ADMIN — DEXTROSE MONOHYDRATE 25 G: 25 INJECTION, SOLUTION INTRAVENOUS at 11:42

## 2022-08-10 RX ADMIN — ACETAMINOPHEN 325MG 650 MG: 325 TABLET ORAL at 04:26

## 2022-08-10 RX ADMIN — CHLORHEXIDINE GLUCONATE 15 ML: 1.2 RINSE ORAL at 08:29

## 2022-08-10 RX ADMIN — PROPOFOL 35 MCG/KG/MIN: 10 INJECTION, EMULSION INTRAVENOUS at 00:24

## 2022-08-10 RX ADMIN — CLONAZEPAM 1 MG: 0.5 TABLET ORAL at 10:54

## 2022-08-10 RX ADMIN — SENNOSIDES AND DOCUSATE SODIUM 2 TABLET: 50; 8.6 TABLET ORAL at 20:57

## 2022-08-10 RX ADMIN — CLONAZEPAM 1 MG: 0.5 TABLET ORAL at 18:08

## 2022-08-10 RX ADMIN — CEFEPIME 2 G: 1 INJECTION, SOLUTION INTRAVENOUS at 07:31

## 2022-08-10 RX ADMIN — CEFEPIME 2 G: 1 INJECTION, SOLUTION INTRAVENOUS at 16:20

## 2022-08-10 RX ADMIN — Medication 10 ML: at 08:29

## 2022-08-10 RX ADMIN — VANCOMYCIN HYDROCHLORIDE 1000 MG: 5 INJECTION, POWDER, LYOPHILIZED, FOR SOLUTION INTRAVENOUS at 20:57

## 2022-08-10 RX ADMIN — Medication 100 MCG/HR: at 04:27

## 2022-08-10 RX ADMIN — Medication 10 ML: at 20:57

## 2022-08-10 RX ADMIN — FUROSEMIDE 40 MG: 10 INJECTION, SOLUTION INTRAMUSCULAR; INTRAVENOUS at 12:27

## 2022-08-10 RX ADMIN — PROPOFOL 35 MCG/KG/MIN: 10 INJECTION, EMULSION INTRAVENOUS at 10:53

## 2022-08-10 RX ADMIN — PROPOFOL 40 MCG/KG/MIN: 10 INJECTION, EMULSION INTRAVENOUS at 04:45

## 2022-08-10 RX ADMIN — MAGNESIUM SULFATE 4 G: 4 INJECTION INTRAVENOUS at 06:35

## 2022-08-10 RX ADMIN — FAMOTIDINE 20 MG: 10 INJECTION INTRAVENOUS at 08:28

## 2022-08-10 RX ADMIN — SODIUM CHLORIDE, POTASSIUM CHLORIDE, SODIUM LACTATE AND CALCIUM CHLORIDE 100 ML/HR: 600; 310; 30; 20 INJECTION, SOLUTION INTRAVENOUS at 10:53

## 2022-08-10 RX ADMIN — SULFUR HEXAFLUORIDE 2 ML: KIT at 08:40

## 2022-08-10 RX ADMIN — MAGNESIUM SULFATE HEPTAHYDRATE 2 G: 40 INJECTION, SOLUTION INTRAVENOUS at 10:54

## 2022-08-10 NOTE — PROGRESS NOTES
Reason for consultation respiratory failure    Subjective  Patient critically ill intubated in the ICU  Intubated on 8/9/2022 for worsening respiratory failure  Was in atrial fibrillation requiring diltiazem drip this has been discontinued as he is converted to sinus rhythm  Patient also on heparin drip for A. fib        Vitals:    08/10/22 1150 08/10/22 1200 08/10/22 1215 08/10/22 1230   BP:  99/55 98/53 93/58   Pulse:  82 84 83   Resp:       Temp: 98.2 °F (36.8 °C)      TempSrc: Bladder      SpO2:  100% 100% 100%   Weight:       Height:           General intubated male  Coarse ventilator breath sounds  Heart currently in sinus rhythm  Abdomen soft nontender  Skin no significant edema or rashes     Assessment  aCute hypoxic and hypercapnic respiratory failure  Noncardiogenic pulmonary edema secondary to heroin  Acute heroin ingestion  Septic shock  Multifocal pneumonia likely aspiration concern for gram-positive and gram-negative organisms  Altered mental status-secondary to metabolic encephalopathy from heroin injection and CO2 narcosis  Assessment  Respiratory acidosis  Hemoptysis  Lactic acidosis  Acute renal failure  Transaminitis  Atrial fibrillation with rapid ventricular response    Plan  We will proceed with bronchoscopy today with bronchoalveolar lavage to see if we can help isolate an organism to help guide antibiotic treatment    Continue current ventilator settings assist-control tidal volume 440 respiratory rate of 20 and a PEEP of 10, will decrease PEEP to 8 today    Will reassess patient this afternoon and give sedation vacation and possible extubate    With atrial fibrillation echocardiogram has been obtained and is unremarkable thyroid function also unremarkable    I feel that the patient's atrial fibrillation most likely secondary to methamphetamine injection and drug toxicity    Patient has a [id number] score of 0 given [id number] were 0 and current sinus rhythm will discontinue  anticoagulation    Continue antibiotics    Continue propofol and fentanyl for sedation    We will schedule Klonopin    Continue Levophed to maintain mean arterial pressure of 65    Hold off on tube feeds and assess for extubation today      Patient is critically ill in ICU with  respiratory failure, multifocal pneumonia, septic shock  I spent 44 minutes of critical care time excluding any procedure notes, spent in review, analysis, obtaining history and physical, formulating care plan, and I led multi-disciplinary critical care rounds with bedside nurse, respiratory therapist, clinical pharmacist and other allied services. I have discussed the case with primary service and other consultants as well.

## 2022-08-10 NOTE — PROGRESS NOTES
"Pharmacy Consult-Vancomycin Dosing - day 2  Gregory Miller is a  36 y.o. male receiving vancomycin therapy.     Indication: PNA  Consulting Provider: Vidya Quevedo  Duration of therapy: 5 days or TBD    Goal AUC: 400 - 600 mg/L*hr    Current Antimicrobial Therapy  Cefepime    Labs  Results from last 7 days   Lab Units 08/10/22  0440 08/09/22  0936   BUN mg/dL 19 18   CREATININE mg/dL 1.45* 1.98*     Results from last 7 days   Lab Units 08/10/22  0440 08/09/22  0936   WBC 10*3/mm3 14.08* 11.61*     Evaluation of Dosing  Last Dose Received in the ED/Outside Facility: 8/9 @1038 loading dose vanc 1750mg x1  Is Patient on Dialysis or Renal Replacement: No    Ht - 177.8 cm (70\")  Wt - 90 kg (198 lb 6.6 oz)    Estimated Creatinine Clearance: 81 mL/min (A) (by C-G formula based on SCr of 1.45 mg/dL (H)).    Intake & Output (last 3 days)         08/07 0701  08/08 0700 08/08 0701  08/09 0700 08/09 0701  08/10 0700    I.V. (mL/kg)   1174 (13)    Other   30    IV Piggyback   3719    Total Intake(mL/kg)   4923 (54.7)    Urine (mL/kg/hr)   3700    Total Output   3700    Net   +1223                 Microbiology and Radiology  8/9 blood cx: in process  8/9 sputum: to be collected  8/9 S pneumo Ag / legionella Ag: NG  8/9 CXR: Extensive airspace consolidation throughout the lung fields possibly secondary to ARDS, multifocal pneumonia or pulmonary edema.     Assessment/Plan:  36yoM, 90kg patient admitted to the ICU with respiratory failure, PTD vancomycin for pneumonia. Vancomycin 1750mg x1 loading dose administered yesterday and started on a regimen of vanc 750mg q12h.   Random level this morning ~6.5hr post previous dose resulted as 11.2mcg/mL. With current dosing [facility] predicts AUC <400. SCr trended down since admission, will plan to adjust maintenance dose to vanc 1000mg q12h to achieve better AUC goals with the following parameters:    New Regimen: 1000 mg IV every 12 hours.  AUC24,ss: 516 mg/L.hr  PAUC*: 85 " %  Ctrough,ss: 18.1 mg/L  Pconc*: 37 %  Tox.: 14 %    AM BMP tomorrow to trend renal function.

## 2022-08-10 NOTE — PROGRESS NOTES
[facility]   Hospitalist Progress Note  Date: 8/10/2022  Patient Name: Gregory Miller  : 1985  MRN: [id number]  Date of admission: 2022      Subjective   Subjective     Chief Complaint: DRUG OVERDOSE     Summary: 36-year-old man that presents with drug overdose.  He arrived via EMS from home.  Patient was found unresponsive.  He was given 2 of Narcan immediately.  He was more responsive after the Narcan.  The EMS noticed that there are track marks on his left arm.  Patient acknowledges using heroin on day of admission. On arrival to the ED, patient had a temperature of 93.4, pulse of 143, respiratory rate of 26, blood pressure 110/99, and he was saturating 81%.  Patient eventually was placed on a high flow nasal cannula mask and was saturating 93%.Initial ABG showed: 7.1/52.2/less than 40/18.4.  His lactate was 5.8.  Patient's troponin was negative.  HIV negative.  Creatinine kinase was 240.  proBNP was 21.7.  Thyroid function was normal.  His white blood cell count is 11.61.  Ethanol was negative.  Leukos was 233.  Creatinine is 1.98.  Tox screen was positive for methamphetamine and opiates.Head CT shows no acute fracture or intracranial hemorrhage.  Chronic facial fractures.  Chronic encephalomalacia in the anterior inferior frontal lobes bilaterally likely posttraumatic.  CT chest shows motion artifact.  Extensive airspace consolidation through the lung fields possibly secondary to arts, multifocal pneumonia or pulmonary edema.    Interval Followup:   Patient required intubation on 2022 for worsening respiratory failure.  Patient also and atrial fibrillation requiring diltiazem drip.  Patient eventually converted to normal sinus rhythm.    Review of Systems   Unable to obtain since patient is intubated on the ventilator    Objective   Objective     Vitals:   Temp:  [93.4 °F (34.1 °C)-101.5 °F (38.6 °C)] 101.1 °F (38.4 °C)  Heart Rate:  [] 98  Resp:  [24-42]  24  BP: ()/() 98/61  Flow (L/min):  [50] 50  FiO2 (%):  [24 %-100 %] 24 %  Physical Exam    Constitutional: Intubated sedated on the ventilator   Eyes: Pupils equal, sclerae anicteric, no conjunctival injection   HENT: NCAT, mucous membranes moist. ET tube in place    Neck: Supple, no thyromegaly, no lymphadenopathy, trachea midline   Respiratory: Clear to auscultation bilaterally, nonlabored respirations, breath sounds synchronous with the ventilator.  No wheezing or rhonchi   Cardiovascular: RRR, no murmurs   Gastrointestinal: Positive bowel sounds, soft, nontender, nondistended   Musculoskeletal: No bilateral ankle edema, no clubbing or cyanosis to extremities   Psychiatric: unable to evaluate    Neurologic: unable to evaluate intubated and sedated   Skin: No rashes     Result Review    Result Review:  I have personally reviewed the results from the time of this admission to 8/10/2022 09:50 EDT and agree with these findings:  [x]  Laboratory   CBC    CBC 8/9/22 8/10/22   WBC 11.61 (A) 14.08 (A)   RBC 6.16 (A) 5.33   Hemoglobin 16.8 14.7   Hematocrit 54.4 (A) 44.7   MCV 88.3 83.9   MCH 27.3 27.6   MCHC 30.9 (A) 32.9   RDW 13.2 13.5   Platelets 440 294   (A) Abnormal value            BMP    BMP 8/9/22 8/9/22 8/9/22 8/10/22    0936 0945 1115    BUN 18   19   Creatinine 1.98 (A)   1.45 (A)   Sodium 143 143.1 142.3 141   Potassium 3.9   4.2   Chloride 104   106   CO2 26.1   26.1   Calcium 9.4   7.7 (A)   (A) Abnormal value              [x]  Microbiology   Blood Culture   Date Value Ref Range Status   08/09/2022 No growth at 24 hours  Preliminary     No results found for: BCIDPCR, CXREFLEX, CSFCX, CULTURETIS  No results found for: CULTURES, HSVCX, URCX  No results found for: EYECULTURE, GCCX, HSVCULTURE, LABHSV  No results found for: LEGIONELLA, MRSACX, MUMPSCX, MYCOPLASCX  No results found for: NOCARDIACX, STOOLCX  No results found for: THROATCX, UNSTIMCULT, URINECX, CULTURE, VZVCULTUR  No results found  for: VIRALCULTU, WOUNDCX    [x]  Radiology  XR Chest 1 View    Result Date: 8/9/2022  PROCEDURE: XR CHEST 1 VW  COMPARISON: [facility], CR, XR CHEST 1 VW, 8/09/2022, 10:15.  INDICATIONS: Confirm ET Tube Placement  FINDINGS:  The tip of the endotracheal tube is in good position located just above the aortic knob.  There has been placement of a right internal jugular line with the tip terminating at the right atrial/SVC junction with no pneumothorax.  There is worsening bilateral airspace disease, right greater than left side presumed to represent multifocal pneumonia.  There is no pleural effusion.  The heart size is normal.  The bony thorax is normal for age.      Impression:   1. The endotracheal tube and right internal jugular line are in good position.  There is no pneumothorax.  2. Worsening bilateral airspace disease presumably representing multifocal pneumonia.       ELISE SPICER MD       Electronically Signed and Approved By: ELISE SPICER MD on 8/09/2022 at 17:51             XR Chest 1 View    Result Date: 8/9/2022  PROCEDURE: XR CHEST 1 VW  COMPARISON: None  INDICATIONS: COUGH TODAY; SUSPECT SPRAY PAINT INHALATION  FINDINGS:   The lungs are well-expanded. The heart and pulmonary vasculature are within normal limits. No pleural effusions are identified. There is extensive airspace opacity in the lung fields.  No evidence of pneumothorax.  IMPRESSION:  Extensive airspace opacity in the lung fields possibly secondary to ARDS, pulmonary edema or multifocal pneumonia.  SHELBI CARMICHAEL MD       Electronically Signed and Approved By: SHELBI CARMICHAEL MD on 8/09/2022 at 10:23               [x]  EKG/Telemetry   [x]  Cardiology/Vascular   []  Pathology  []  Old records  []  Other:    Assessment & Plan   Assessment / Plan     Assessment/Plan:  Acute hypoxic and hypercapnic respiratory failure  Methamphetamine abuse  Noncardiogenic pulmonary edema secondary to heroin  Accidental drug overdose due to  heroin  Septic shock  Multifocal pneumonia likely aspiration concern for gram-positive and gram-negative organisms  Altered mental status-secondary to metabolic encephalopathy from heroin injection and CO2 narcosis  Atrial fibrillation with RVR, resolved  Lactic acidosis    PLAN   patient remains on the ventilator.  Ventilator management per pulmonary  Pulmonary to perform bronchoscopy today  Continue broad-spectrum antibiotics  Patient now is in normal sinus rhythm suspect atrial fibrillation secondary to drug use  Continue Levophed as needed  Discussed plan with RN.    DVT prophylaxis:  Medical DVT prophylaxis orders are present.    CODE STATUS:   Code Status (Patient has no pulse and is not breathing): CPR (Attempt to Resuscitate)  Medical Interventions (Patient has pulse or is breathing): Full Support  Release to patient: Routine Release    Patient is critically ill in ICU with  respiratory failure, multifocal pneumonia, septic shock  I spent 31 minutes of critical care time  spent in review, analysis, obtaining history and physical, formulating care plan,     Electronically signed by Rogelio Man DO, 08/10/22, 9:50 AM EDT.

## 2022-08-10 NOTE — PLAN OF CARE
Goal Outcome Evaluation:  Pt extubated today at 1420. Dc'd all critical drips. Pt alert and oriented, passed bedside speech, started on regular diet. Dc'd f/c. Removed central line. Pt did require one dose of D50 before being extubated with a BG of 63, BG was 146 after D50. VSS.

## 2022-08-10 NOTE — CONSULTS
"Nutrition Services    Patient Name: Gregory Miller  YOB: 1985  MRN: [id number]  Admission date: 8/9/2022      Clinical Nutrition Assessment      Reason for Assessment: EN, NPO on vent       Clinical Course/Narrative: Patient admitted to ICU w/ acute hypoxic respiratory failure. Currently intubated and sedated. Per MD, will put in recommendations for tube feeds.             Past Medical History:   Diagnosis Date   • Seizures (HCC)     childhood, last one at age 14   • Traumatic brain injury (HCC)     3 years ago       Past Surgical History:   Procedure Laterality Date   • BRAIN SURGERY            Nutrition Intervention:        PO Diet/Supplements: NPO Diet NPO Type: Strict NPO   No active supplement orders         Intake/Output:   Intake/Output Summary (Last 24 hours) at 8/10/2022 1454  Last data filed at 8/10/2022 0500  Gross per 24 hour   Intake 1204 ml   Output 3700 ml   Net -2496 ml            Height: Height: 177.8 cm (70\")   Weight: Weight: 90 kg (198 lb 6.6 oz) (08/09/22 1140)   BMI: Body mass index is 28.47 kg/m².     Estimated/Assessed Needs       Energy Requirements 12-25 kcal/kg   EST Needs (kcal/day) 8543-0642       Protein Requirements 1.5 g/kg   EST Daily Needs (g/day) 135        Fluid Requirements 1 ml/kcal    Estimated Needs (mL/day) 0682-6532      Results from last 7 days   Lab Units 08/10/22  0440 08/09/22  1115 08/09/22  0945 08/09/22  0936   SODIUM mmol/L 141  --   --  143   SODIUM, ARTERIAL mmol/L  --  142.3 143.1  --    POTASSIUM mmol/L 4.2  --   --  3.9   CHLORIDE mmol/L 106  --   --  104   CO2 mmol/L 26.1  --   --  26.1   BUN mg/dL 19  --   --  18   CREATININE mg/dL 1.45*  --   --  1.98*   CALCIUM mg/dL 7.7*  --   --  9.4   BILIRUBIN mg/dL  --   --   --  1.5*   ALK PHOS U/L  --   --   --  80   ALT (SGPT) U/L  --   --   --  35   AST (SGOT) U/L  --   --   --  43*   GLUCOSE mg/dL 93  --   --  233*   GLUCOSE, ARTERIAL mmol/L  --  53* 172*  --      Results from last 7 " days   Lab Units 08/10/22  0440   MAGNESIUM mg/dL 1.4*   PHOSPHORUS mg/dL 4.1   HEMOGLOBIN g/dL 14.7   HEMATOCRIT % 44.7     No results found for: HGBA1C    Malnutrition Severity Assessment                 Medical Nutrition Therapy/Nutrition Education          Learner     Readiness Patient  Education not appropriate at this time     Nutrition Diagnosis         Nutrition Dx Problem 1 Inadequate energy Intake related to Inability to consume sufficient energy as evidenced by intubated/sedated, NPO.       Nutrition Intervention         If enteral nutrition is initiated, recommend:     Peptamen AF @ 73 ml/hr  Free water flushes of 85 ml q 3 hrs     Provides:   2102 kcal, 133 g protein, and 2099 ml fluid         Monitor/Evaluation        Monitor Per protocol, I&O, Pertinent labs, EN delivery/tolerance, Weight, POC/GOC     Nutrition Discharge Plan         To be determined       RD to follow up per protocol.    Electronically signed by:  Layo Matamoros RD  08/10/22 14:54 EDT

## 2022-08-10 NOTE — PROCEDURES
Procedure name bronchoscopy    Indication multifocal pneumonia and respiratory failure    Consent obtained    Timeout performed prior to procedure    Procedure details  Bronchoscope inserted into the endotracheal tube it appeared to be in appropriate position approximately 4 cm above the main shaye, there were scattered brownish colored mucopurulent secretions seen throughout the entire tracheobronchial tree, a BAL was obtained from the right lower lobe bronchus, no endobronchial lesions seen.    Specimens obtained BAL from right lower lobe bronchus    Estimated blood loss 0    Patient tolerated procedure well with no immediate complications    Postoperative diagnosis  Respiratory failure  Abnormal appearing secretions  Appropriate position of endotracheal tube    Electronically signed by Jose Melendrez DO, 08/10/22, 4:08 PM EDT.

## 2022-08-11 ENCOUNTER — APPOINTMENT (OUTPATIENT)
Dept: GENERAL RADIOLOGY | Facility: HOSPITAL | Age: 37
End: 2022-08-11

## 2022-08-11 LAB
ACB CMPLX DNA BAL NAA+NON-PRB-NCNCRNG: NOT DETECTED
ALBUMIN SERPL-MCNC: 2.8 G/DL (ref 3.5–5.2)
ALP SERPL-CCNC: 65 U/L (ref 39–117)
ALT SERPL W P-5'-P-CCNC: 23 U/L (ref 1–41)
ANION GAP SERPL CALCULATED.3IONS-SCNC: 11.2 MMOL/L (ref 5–15)
AST SERPL-CCNC: 31 U/L (ref 1–40)
BASOPHILS # BLD AUTO: 0.06 10*3/MM3 (ref 0–0.2)
BASOPHILS NFR BLD AUTO: 0.5 % (ref 0–1.5)
BILIRUB CONJ SERPL-MCNC: 0.6 MG/DL (ref 0–0.3)
BILIRUB INDIRECT SERPL-MCNC: 2.4 MG/DL
BILIRUB SERPL-MCNC: 3 MG/DL (ref 0–1.2)
BLACTX-M ISLT/SPM QL: ABNORMAL
BLAIMP ISLT/SPM QL: ABNORMAL
BLAKPC ISLT/SPM QL: ABNORMAL
BLAOXA-48-LIKE ISLT/SPM QL: ABNORMAL
BLAVIM ISLT/SPM QL: ABNORMAL
BUN SERPL-MCNC: 17 MG/DL (ref 6–20)
BUN/CREAT SERPL: 13.7 (ref 7–25)
C PNEUM DNA NPH QL NAA+NON-PROBE: NOT DETECTED
CALCIUM SPEC-SCNC: 8.5 MG/DL (ref 8.6–10.5)
CHLAMYDIA IGG SER-ACNC: 1.28 RATIO (ref 0–0.9)
CHLORIDE SERPL-SCNC: 99 MMOL/L (ref 98–107)
CO2 SERPL-SCNC: 24.8 MMOL/L (ref 22–29)
CREAT SERPL-MCNC: 1.24 MG/DL (ref 0.76–1.27)
DEPRECATED RDW RBC AUTO: 41.6 FL (ref 37–54)
E CLOAC COMP DNA BAL NAA+NON-PRB-NCNCRNG: NOT DETECTED
E COLI DNA BAL NAA+NON-PRB-NCNCRNG: NOT DETECTED
EGFRCR SERPLBLD CKD-EPI 2021: 78.2 ML/MIN/1.73
EOSINOPHIL # BLD AUTO: 0.2 10*3/MM3 (ref 0–0.4)
EOSINOPHIL NFR BLD AUTO: 1.7 % (ref 0.3–6.2)
ERYTHROCYTE [DISTWIDTH] IN BLOOD BY AUTOMATED COUNT: 13.3 % (ref 12.3–15.4)
FLUAV SUBTYP SPEC NAA+PROBE: NOT DETECTED
FLUBV RNA ISLT QL NAA+PROBE: NOT DETECTED
GLUCOSE BLDC GLUCOMTR-MCNC: 102 MG/DL (ref 70–99)
GLUCOSE BLDC GLUCOMTR-MCNC: 105 MG/DL (ref 70–99)
GLUCOSE BLDC GLUCOMTR-MCNC: 88 MG/DL (ref 70–99)
GLUCOSE SERPL-MCNC: 76 MG/DL (ref 65–99)
GP B STREP DNA BAL NAA+NON-PRB-NCNCRNG: NOT DETECTED
HADV AB TITR SER CF: ABNORMAL {TITER}
HADV DNA SPEC NAA+PROBE: NOT DETECTED
HAEM INFLU DNA BAL NAA+NON-PRB-NCNCRNG: NOT DETECTED
HCOV RNA LOWER RESP QL NAA+NON-PROBE: NOT DETECTED
HCT VFR BLD AUTO: 41.9 % (ref 37.5–51)
HGB BLD-MCNC: 13.8 G/DL (ref 13–17.7)
HMPV RNA NPH QL NAA+NON-PROBE: NOT DETECTED
HPIV RNA LOWER RESP QL NAA+NON-PROBE: NOT DETECTED
IMM GRANULOCYTES # BLD AUTO: 0.09 10*3/MM3 (ref 0–0.05)
IMM GRANULOCYTES NFR BLD AUTO: 0.7 % (ref 0–0.5)
K AEROGENES DNA BAL NAA+NON-PRB-NCNCRNG: NOT DETECTED
K OXYTOCA DNA BAL NAA+NON-PRB-NCNCRNG: NOT DETECTED
K PNEU GRP DNA BAL NAA+NON-PRB-NCNCRNG: NOT DETECTED
L PNEUMO AB SER IA-ACNC: <0.91 OD RATIO (ref 0–0.9)
L PNEUMO DNA LOWER RESP QL NAA+NON-PROBE: NOT DETECTED
LYMPHOCYTES # BLD AUTO: 1.35 10*3/MM3 (ref 0.7–3.1)
LYMPHOCYTES NFR BLD AUTO: 11.2 % (ref 19.6–45.3)
M CATARRHALIS DNA BAL NAA+NON-PRB-NCNCRNG: NOT DETECTED
M PNEUMO IGG SER IA-ACNC: 312 U/ML (ref 0–99)
M PNEUMO IGG SER IA-ACNC: NOT DETECTED
M PNEUMO IGM SER IA-ACNC: <770 U/ML (ref 0–769)
MAGNESIUM SERPL-MCNC: 2.2 MG/DL (ref 1.6–2.6)
MCH RBC QN AUTO: 27.9 PG (ref 26.6–33)
MCHC RBC AUTO-ENTMCNC: 32.9 G/DL (ref 31.5–35.7)
MCV RBC AUTO: 84.8 FL (ref 79–97)
MECA+MECC ISLT/SPM QL: ABNORMAL
MONOCYTES # BLD AUTO: 0.82 10*3/MM3 (ref 0.1–0.9)
MONOCYTES NFR BLD AUTO: 6.8 % (ref 5–12)
NDM GENE: ABNORMAL
NEUTROPHILS NFR BLD AUTO: 79.1 % (ref 42.7–76)
NEUTROPHILS NFR BLD AUTO: 9.49 10*3/MM3 (ref 1.7–7)
NRBC BLD AUTO-RTO: 0 /100 WBC (ref 0–0.2)
P AERUGINOSA DNA BAL NAA+NON-PRB-NCNCRNG: NOT DETECTED
PHOSPHATE SERPL-MCNC: 1.8 MG/DL (ref 2.5–4.5)
PLATELET # BLD AUTO: 192 10*3/MM3 (ref 140–450)
PMV BLD AUTO: 9.6 FL (ref 6–12)
POTASSIUM SERPL-SCNC: 3.7 MMOL/L (ref 3.5–5.2)
PROCALCITONIN SERPL-MCNC: 13.02 NG/ML (ref 0–0.25)
PROT SERPL-MCNC: 6 G/DL (ref 6–8.5)
PROTEUS SP DNA BAL NAA+NON-PRB-NCNCRNG: NOT DETECTED
RBC # BLD AUTO: 4.94 10*6/MM3 (ref 4.14–5.8)
RHINOVIRUS RNA SPEC NAA+PROBE: NOT DETECTED
RSV RNA NPH QL NAA+NON-PROBE: NOT DETECTED
S AUREUS DNA BAL NAA+NON-PRB-NCNCRNG: NOT DETECTED
S MARCESCENS DNA BAL NAA+NON-PRB-NCNCRNG: NOT DETECTED
S PNEUM DNA BAL NAA+NON-PRB-NCNCRNG: NOT DETECTED
S PYO DNA BAL NAA+NON-PRB-NCNCRNG: DETECTED
SODIUM SERPL-SCNC: 135 MMOL/L (ref 136–145)
VANCOMYCIN SERPL-MCNC: 14.43 MCG/ML (ref 5–40)
WBC NRBC COR # BLD: 12.01 10*3/MM3 (ref 3.4–10.8)

## 2022-08-11 PROCEDURE — 36415 COLL VENOUS BLD VENIPUNCTURE: CPT | Performed by: HOSPITALIST

## 2022-08-11 PROCEDURE — 25010000002 ENOXAPARIN PER 10 MG: Performed by: INTERNAL MEDICINE

## 2022-08-11 PROCEDURE — 80048 BASIC METABOLIC PNL TOTAL CA: CPT | Performed by: HOSPITALIST

## 2022-08-11 PROCEDURE — 25010000002 FUROSEMIDE PER 20 MG: Performed by: PHYSICIAN ASSISTANT

## 2022-08-11 PROCEDURE — 84100 ASSAY OF PHOSPHORUS: CPT | Performed by: HOSPITALIST

## 2022-08-11 PROCEDURE — 83735 ASSAY OF MAGNESIUM: CPT | Performed by: HOSPITALIST

## 2022-08-11 PROCEDURE — 80076 HEPATIC FUNCTION PANEL: CPT | Performed by: PHYSICIAN ASSISTANT

## 2022-08-11 PROCEDURE — 84145 PROCALCITONIN (PCT): CPT | Performed by: PHYSICIAN ASSISTANT

## 2022-08-11 PROCEDURE — 85025 COMPLETE CBC W/AUTO DIFF WBC: CPT | Performed by: HOSPITALIST

## 2022-08-11 PROCEDURE — 80202 ASSAY OF VANCOMYCIN: CPT | Performed by: HOSPITALIST

## 2022-08-11 PROCEDURE — 82962 GLUCOSE BLOOD TEST: CPT

## 2022-08-11 PROCEDURE — 99233 SBSQ HOSP IP/OBS HIGH 50: CPT | Performed by: INTERNAL MEDICINE

## 2022-08-11 PROCEDURE — 71045 X-RAY EXAM CHEST 1 VIEW: CPT

## 2022-08-11 RX ORDER — PENICILLIN V POTASSIUM 250 MG/1
500 TABLET ORAL EVERY 8 HOURS SCHEDULED
Status: DISCONTINUED | OUTPATIENT
Start: 2022-08-11 | End: 2022-08-12 | Stop reason: HOSPADM

## 2022-08-11 RX ORDER — FENTANYL/ROPIVACAINE/NS/PF 2-625MCG/1
15 PLASTIC BAG, INJECTION (ML) EPIDURAL ONCE
Status: COMPLETED | OUTPATIENT
Start: 2022-08-11 | End: 2022-08-11

## 2022-08-11 RX ORDER — FUROSEMIDE 10 MG/ML
40 INJECTION INTRAMUSCULAR; INTRAVENOUS
Status: DISCONTINUED | OUTPATIENT
Start: 2022-08-11 | End: 2022-08-12 | Stop reason: HOSPADM

## 2022-08-11 RX ORDER — SODIUM PHOSPHATE IN D5W 15MMOL/250
15 PLASTIC BAG, INJECTION (ML) INTRAVENOUS ONCE
Status: COMPLETED | OUTPATIENT
Start: 2022-08-11 | End: 2022-08-11

## 2022-08-11 RX ORDER — CEFTRIAXONE SODIUM 1 G/50ML
1 INJECTION, SOLUTION INTRAVENOUS EVERY 24 HOURS
Status: DISCONTINUED | OUTPATIENT
Start: 2022-08-11 | End: 2022-08-11

## 2022-08-11 RX ADMIN — PENICILLIN V POTASSIUM 500 MG: 250 TABLET, FILM COATED ORAL at 21:49

## 2022-08-11 RX ADMIN — Medication 10 ML: at 21:49

## 2022-08-11 RX ADMIN — ENOXAPARIN SODIUM 40 MG: 100 INJECTION SUBCUTANEOUS at 09:49

## 2022-08-11 RX ADMIN — FUROSEMIDE 40 MG: 10 INJECTION, SOLUTION INTRAMUSCULAR; INTRAVENOUS at 09:50

## 2022-08-11 RX ADMIN — PENICILLIN V POTASSIUM 500 MG: 250 TABLET, FILM COATED ORAL at 12:28

## 2022-08-11 RX ADMIN — CLONAZEPAM 1 MG: 0.5 TABLET ORAL at 05:41

## 2022-08-11 RX ADMIN — FUROSEMIDE 40 MG: 10 INJECTION, SOLUTION INTRAMUSCULAR; INTRAVENOUS at 18:36

## 2022-08-11 RX ADMIN — SENNOSIDES AND DOCUSATE SODIUM 2 TABLET: 50; 8.6 TABLET ORAL at 09:50

## 2022-08-11 RX ADMIN — Medication 10 ML: at 09:50

## 2022-08-11 RX ADMIN — CLONAZEPAM 1 MG: 0.5 TABLET ORAL at 12:28

## 2022-08-11 RX ADMIN — POTASSIUM PHOSPHATE, MONOBASIC AND POTASSIUM PHOSPHATE, DIBASIC 15 MMOL: 224; 236 INJECTION, SOLUTION, CONCENTRATE INTRAVENOUS at 09:50

## 2022-08-11 RX ADMIN — SODIUM PHOSPHATE, MONOBASIC, MONOHYDRATE AND SODIUM PHOSPHATE, DIBASIC, ANHYDROUS 15 MMOL: 276; 142 INJECTION, SOLUTION INTRAVENOUS at 04:32

## 2022-08-11 NOTE — CONSULTS
"Nutrition Services    Patient Name: Gregory Miller  YOB: 1985  MRN: [id number]  Admission date: 8/9/2022      Clinical Nutrition Assessment      Reason for Assessment: EN, NPO on vent       Clinical Course/Narrative: Patient admitted to ICU w/ acute hypoxic respiratory failure. Exubated 8/10. Passed bedside speech and started on regular diet.            Past Medical History:   Diagnosis Date   • Seizures (HCC)     childhood, last one at age 14   • Traumatic brain injury (HCC)     3 years ago       Past Surgical History:   Procedure Laterality Date   • BRAIN SURGERY            Nutrition Intervention:        PO Diet/Supplements: Diet Regular   No active supplement orders         Intake/Output:   Intake/Output Summary (Last 24 hours) at 8/11/2022 1034  Last data filed at 8/11/2022 0600  Gross per 24 hour   Intake 1676 ml   Output 2745 ml   Net -1069 ml            Height: Height: 177.8 cm (70\")   Weight: Weight: 90 kg (198 lb 6.6 oz) (08/09/22 1140)   BMI: Body mass index is 28.47 kg/m².     Estimated/Assessed Needs       Energy Requirements 25-30 kcal/kg IBW   EST Needs (kcal/day) 9643-6195       Protein Requirements 0.8-1.0 g/kg IBW   EST Daily Needs (g/day) 58-73        Fluid Requirements 1 ml/kcal    Estimated Needs (mL/day) 2532-3809     Results from last 7 days   Lab Units 08/11/22  0335 08/10/22  0440 08/09/22  1115 08/09/22  0945 08/09/22  0936   SODIUM mmol/L 135* 141  --   --  143   SODIUM, ARTERIAL mmol/L  --   --  142.3   < >  --    POTASSIUM mmol/L 3.7 4.2  --   --  3.9   CHLORIDE mmol/L 99 106  --   --  104   CO2 mmol/L 24.8 26.1  --   --  26.1   BUN mg/dL 17 19  --   --  18   CREATININE mg/dL 1.24 1.45*  --   --  1.98*   CALCIUM mg/dL 8.5* 7.7*  --   --  9.4   BILIRUBIN mg/dL 3.0*  --   --   --  1.5*   ALK PHOS U/L 65  --   --   --  80   ALT (SGPT) U/L 23  --   --   --  35   AST (SGOT) U/L 31  --   --   --  43*   GLUCOSE mg/dL 76 93  --   --  233*   GLUCOSE, ARTERIAL mmol/L "  --   --  53*   < >  --     < > = values in this interval not displayed.     Results from last 7 days   Lab Units 08/11/22  0335 08/10/22  0440   MAGNESIUM mg/dL 2.2 1.4*   PHOSPHORUS mg/dL 1.8* 4.1   HEMOGLOBIN g/dL 13.8 14.7   HEMATOCRIT % 41.9 44.7     No results found for: HGBA1C    Malnutrition Severity Assessment                 Medical Nutrition Therapy/Nutrition Education          Learner     Readiness Patient  Education not indicated at this time     Nutrition Diagnosis         Nutrition Dx Problem 1 No nutrition diagnosis at this time.       Nutrition Intervention         No nutrition intervention indicated at this time.     Monitor/Evaluation        Monitor Per protocol, PO intake, Pertinent labs     Nutrition Discharge Plan         To be determined       RD to follow up per protocol.    Electronically signed by:  Layo Matamoros RD  08/11/22 10:34 EDT

## 2022-08-11 NOTE — PROGRESS NOTES
Reason for consultation respiratory failure    Subjective  Patient extubated on 8/10/2022  History limited from the patient today he is very somnolent  Off pressors  Still requiring oxygen      Review of systems Limited due to mental status      Vitals:    08/11/22 0900 08/11/22 1000 08/11/22 1100 08/11/22 1200   BP: (!) 79/65 97/59 92/63    BP Location:  Left arm Left arm    Patient Position:  Lying Lying    Pulse: 96 86 91    Resp:  22 21    Temp:    100 °F (37.8 °C)   TempSrc:    Oral   SpO2: (!) 89% 94% 96%    Weight:       Height:           Vital Signs Reviewed  General WDWN, Alert, NAD.    HEENT:  PERRL, EOMI.  OP, nares clear, no sinus tenderness  Neck:  Supple, no JVD, no thyromegaly  Lymph: no axillary, cervical, supraclavicular lymphadenopathy noted bilaterally  Chest:  good aeration, clear to auscultation bilaterally, tympanic to percussion bilaterally, no work of breathing noted  CV: RRR, no MGR, pulses 2+, equal.  Abd:  Soft, NT, ND, + BS, no HSM  EXT:  no clubbing, no cyanosis, no edema, no joint tenderness  Neuro: Awake and alert but somnolent CN grossly intact, no focal deficits.  Skin: No rashes or lesions noted     Assessment  aCute hypoxic and hypercapnic respiratory failure  Noncardiogenic pulmonary edema secondary to heroin  Acute heroin ingestion  Septic shock  Multifocal pneumonia likely aspiration concern for gram-positive and gram-negative organisms-secondary to strep agalactiae  Altered mental status-secondary to metabolic encephalopathy from heroin injection and CO2 narcosis  Assessment  Respiratory acidosis  Hemoptysis  Lactic acidosis  Acute renal failure  Transaminitis  Atrial fibrillation with rapid ventricular response    Plan  De-escalate antibiotics penicillin given results of BAL    Replace phosphorus with IV Phos today    We will transfer patient out of ICU    Needs to be up out of bed into the chair    Still very somnolent today we will discontinue Klonopin    Discontinue  fluids    Telemetry reviewed showing normal sinus rhythm, laboratory data reviewed showing electrolyte derangements, case discussed on multidisciplinary critical care rounds with pharmacy regarding medications, nursing regarding plan of care  Case also discussed with hospitalist      Electronically signed by Jose Melendrez DO, 08/11/22, 12:45 PM EDT.

## 2022-08-11 NOTE — PROGRESS NOTES
[facility]   Hospitalist Progress Note  Date: 2022  Patient Name: Gregory Miller  : 1985  MRN: [id number]  Date of admission: 2022      Subjective   Subjective     Chief Complaint: DRUG OVERDOSE     Summary: 36-year-old man that presents with drug overdose.  He arrived via EMS from home.  Patient was found unresponsive.  He was given 2 of Narcan immediately.  He was more responsive after the Narcan.  The EMS noticed that there are track marks on his left arm.  Patient acknowledges using heroin on day of admission. On arrival to the ED, patient had a temperature of 93.4, pulse of 143, respiratory rate of 26, blood pressure 110/99, and he was saturating 81%.  Patient eventually was placed on a high flow nasal cannula mask and was saturating 93%.Initial ABG showed: 7.1/52.2/less than 40/18.4.  His lactate was 5.8.  Patient's troponin was negative.  HIV negative.  Creatinine kinase was 240.  proBNP was 21.7.  Thyroid function was normal.  His white blood cell count is 11.61.  Ethanol was negative.  Leukos was 233.  Creatinine is 1.98.  Tox screen was positive for methamphetamine and opiates.Head CT shows no acute fracture or intracranial hemorrhage.  Chronic facial fractures.  Chronic encephalomalacia in the anterior inferior frontal lobes bilaterally likely posttraumatic.  CT chest shows motion artifact.  Extensive airspace consolidation through the lung fields possibly secondary to arts, multifocal pneumonia or pulmonary edema.    Interval Followup:   Patient required intubation on 2022 for worsening respiratory failure.  Patient also and atrial fibrillation requiring diltiazem drip.  Patient eventually converted to normal sinus rhythm.  Patient was extubated on August 10, 2022.  Patient is very somnolent today.  Patient is off pressors however still requiring oxygen.  Repeat chest x-ray showing diffuse bilateral airspace opacities unchanged suggesting ongoing  pneumonia    Review of Systems   Unable to obtain since patient is very somnolent    Objective   Objective     Vitals:   Temp:  [99.7 °F (37.6 °C)-100.4 °F (38 °C)] 100 °F (37.8 °C)  Heart Rate:  [] 94  Resp:  [18-36] 22  BP: ()/(51-93) 109/73  Flow (L/min):  [2] 2  Physical Exam    Constitutional: Patient is asleep.  No distress   Eyes: Pupils equal, sclerae anicteric, no conjunctival injection   HENT: NCAT, mucous membranes moist.   Neck: Supple, no thyromegaly, no lymphadenopathy, trachea midline   Respiratory: Clear to auscultation bilaterally, nonlabored respirations, No wheezing or rhonchi   Cardiovascular: RRR, no murmurs   Gastrointestinal: Positive bowel sounds, soft, nontender, nondistended   Musculoskeletal: No bilateral ankle edema, no clubbing or cyanosis to extremities   Psychiatric: unable to evaluate    Neurologic: unable to evaluate since patient is so somnolent   Skin: No rashes     Result Review    Result Review:  I have personally reviewed the results from the time of this admission to 8/11/2022 14:15 EDT and agree with these findings:  [x]  Laboratory   CBC    CBC 8/9/22 8/10/22 8/11/22   WBC 11.61 (A) 14.08 (A) 12.01 (A)   RBC 6.16 (A) 5.33 4.94   Hemoglobin 16.8 14.7 13.8   Hematocrit 54.4 (A) 44.7 41.9   MCV 88.3 83.9 84.8   MCH 27.3 27.6 27.9   MCHC 30.9 (A) 32.9 32.9   RDW 13.2 13.5 13.3   Platelets 440 294 192   (A) Abnormal value            BMP    BMP 8/9/22 8/9/22 8/9/22 8/10/22 8/11/22    0936 0945 1115     BUN 18   19 17   Creatinine 1.98 (A)   1.45 (A) 1.24   Sodium 143 143.1 142.3 141 135 (A)   Potassium 3.9   4.2 3.7   Chloride 104   106 99   CO2 26.1   26.1 24.8   Calcium 9.4   7.7 (A) 8.5 (A)   (A) Abnormal value              [x]  Microbiology   Blood Culture   Date Value Ref Range Status   08/09/2022 No growth at 24 hours  Preliminary     No results found for: BCIDPCR, CXREFLEX, CSFCX, CULTURETIS  No results found for: CULTURES, HSVCX, URCX  No results found for:  EYECULTURE, GCCX, HSVCULTURE, LABHSV  No results found for: LEGIONELLA, MRSACX, MUMPSCX, MYCOPLASCX  No results found for: NOCARDIACX, STOOLCX  No results found for: THROATCX, UNSTIMCULT, URINECX, CULTURE, VZVCULTUR  No results found for: VIRALCULTU, WOUNDCX    [x]  Radiology  XR Chest 1 View    Result Date: 8/11/2022  PROCEDURE: XR CHEST 1 VW  COMPARISON: [facility], CT, CT CHEST WO CONTRAST DIAGNOSTIC, 8/09/2022, 10:23.  [facility], CR, XR CHEST 1 VW, 8/09/2022, 17:25.  INDICATIONS: pneumonia  FINDINGS:  Diffuse bilateral airspace opacities are unchanged.  The heart and mediastinal contours appear stable.  The osseous structures appear intact.      Impression:  Diffuse bilateral airspace opacities are unchanged, suggesting ongoing pneumonia.       RICARDO WRIGHT MD       Electronically Signed and Approved By: RICARDO WRIGHT MD on 8/11/2022 at 11:03             XR Chest 1 View    Result Date: 8/9/2022  PROCEDURE: XR CHEST 1 VW  COMPARISON: [facility], CR, XR CHEST 1 VW, 8/09/2022, 10:15.  INDICATIONS: Confirm ET Tube Placement  FINDINGS:  The tip of the endotracheal tube is in good position located just above the aortic knob.  There has been placement of a right internal jugular line with the tip terminating at the right atrial/SVC junction with no pneumothorax.  There is worsening bilateral airspace disease, right greater than left side presumed to represent multifocal pneumonia.  There is no pleural effusion.  The heart size is normal.  The bony thorax is normal for age.      Impression:   1. The endotracheal tube and right internal jugular line are in good position.  There is no pneumothorax.  2. Worsening bilateral airspace disease presumably representing multifocal pneumonia.       ELISE SPICER MD       Electronically Signed and Approved By: ELISE SPICER MD on 8/09/2022 at 17:51             XR Chest 1 View    Result Date: 8/9/2022  PROCEDURE: XR CHEST 1 VW   COMPARISON: None  INDICATIONS: COUGH TODAY; SUSPECT SPRAY PAINT INHALATION  FINDINGS:   The lungs are well-expanded. The heart and pulmonary vasculature are within normal limits. No pleural effusions are identified. There is extensive airspace opacity in the lung fields.  No evidence of pneumothorax.  IMPRESSION:  Extensive airspace opacity in the lung fields possibly secondary to ARDS, pulmonary edema or multifocal pneumonia.  SHELBI CARMICHAEL MD       Electronically Signed and Approved By: SHELBI CARMICHAEL MD on 8/09/2022 at 10:23               [x]  EKG/Telemetry   [x]  Cardiology/Vascular   []  Pathology  []  Old records  []  Other:    Assessment & Plan   Assessment / Plan     Assessment/Plan:  Acute hypoxic and hypercapnic respiratory failure  Methamphetamine abuse  Noncardiogenic pulmonary edema secondary to heroin  Accidental drug overdose due to heroin  Septic shock  Multifocal pneumonia likely aspiration concern for gram-positive and gram-negative organisms  Altered mental status-secondary to metabolic encephalopathy from heroin injection and CO2 narcosis  Atrial fibrillation with RVR, resolved  Lactic acidosis    PLAN   antibiotics have been de-escalated to penicillin given results of BAL showing strep pyogenes  Klonopin has been discontinued as patient is very somnolent  Discontinue IV fluids  Patient now is in normal sinus rhythm suspect atrial fibrillation secondary to drug use  Transfer out of unit  Discussed plan with RN.    DVT prophylaxis:  Medical DVT prophylaxis orders are present.    CODE STATUS:   Code Status (Patient has no pulse and is not breathing): CPR (Attempt to Resuscitate)  Medical Interventions (Patient has pulse or is breathing): Full Support  Release to patient: Routine Release

## 2022-08-12 VITALS
DIASTOLIC BLOOD PRESSURE: 62 MMHG | BODY MASS INDEX: 28.41 KG/M2 | HEIGHT: 70 IN | TEMPERATURE: 98 F | HEART RATE: 89 BPM | RESPIRATION RATE: 27 BRPM | SYSTOLIC BLOOD PRESSURE: 92 MMHG | WEIGHT: 198.41 LBS | OXYGEN SATURATION: 96 %

## 2022-08-12 PROBLEM — J13 PNEUMONIA DUE TO STREPTOCOCCUS PNEUMONIAE: Status: ACTIVE | Noted: 2022-08-12

## 2022-08-12 PROBLEM — F19.10 POLYSUBSTANCE ABUSE (HCC): Status: ACTIVE | Noted: 2022-08-12

## 2022-08-12 PROBLEM — T50.901A DRUG OVERDOSE, ACCIDENTAL OR UNINTENTIONAL, INITIAL ENCOUNTER: Status: ACTIVE | Noted: 2022-08-12

## 2022-08-12 LAB
ANION GAP SERPL CALCULATED.3IONS-SCNC: 11.2 MMOL/L (ref 5–15)
BACTERIA SPEC AEROBE CULT: NO GROWTH
BASOPHILS # BLD AUTO: 0.03 10*3/MM3 (ref 0–0.2)
BASOPHILS NFR BLD AUTO: 0.4 % (ref 0–1.5)
BUN SERPL-MCNC: 10 MG/DL (ref 6–20)
BUN/CREAT SERPL: 11.6 (ref 7–25)
CALCIUM SPEC-SCNC: 9.5 MG/DL (ref 8.6–10.5)
CHLORIDE SERPL-SCNC: 100 MMOL/L (ref 98–107)
CO2 SERPL-SCNC: 27.8 MMOL/L (ref 22–29)
CREAT SERPL-MCNC: 0.86 MG/DL (ref 0.76–1.27)
DEPRECATED RDW RBC AUTO: 39.4 FL (ref 37–54)
EGFRCR SERPLBLD CKD-EPI 2021: 116.5 ML/MIN/1.73
EOSINOPHIL # BLD AUTO: 0.21 10*3/MM3 (ref 0–0.4)
EOSINOPHIL NFR BLD AUTO: 2.5 % (ref 0.3–6.2)
ERYTHROCYTE [DISTWIDTH] IN BLOOD BY AUTOMATED COUNT: 12.9 % (ref 12.3–15.4)
GLUCOSE BLDC GLUCOMTR-MCNC: 104 MG/DL (ref 70–99)
GLUCOSE BLDC GLUCOMTR-MCNC: 80 MG/DL (ref 70–99)
GLUCOSE SERPL-MCNC: 81 MG/DL (ref 65–99)
GRAM STN SPEC: NORMAL
GRAM STN SPEC: NORMAL
HCT VFR BLD AUTO: 40.2 % (ref 37.5–51)
HGB BLD-MCNC: 13.5 G/DL (ref 13–17.7)
IMM GRANULOCYTES # BLD AUTO: 0.09 10*3/MM3 (ref 0–0.05)
IMM GRANULOCYTES NFR BLD AUTO: 1.1 % (ref 0–0.5)
LYMPHOCYTES # BLD AUTO: 1.17 10*3/MM3 (ref 0.7–3.1)
LYMPHOCYTES NFR BLD AUTO: 14 % (ref 19.6–45.3)
MAGNESIUM SERPL-MCNC: 2 MG/DL (ref 1.6–2.6)
MCH RBC QN AUTO: 27.9 PG (ref 26.6–33)
MCHC RBC AUTO-ENTMCNC: 33.6 G/DL (ref 31.5–35.7)
MCV RBC AUTO: 83.1 FL (ref 79–97)
MONOCYTES # BLD AUTO: 0.78 10*3/MM3 (ref 0.1–0.9)
MONOCYTES NFR BLD AUTO: 9.3 % (ref 5–12)
NEUTROPHILS NFR BLD AUTO: 6.09 10*3/MM3 (ref 1.7–7)
NEUTROPHILS NFR BLD AUTO: 72.7 % (ref 42.7–76)
NRBC BLD AUTO-RTO: 0 /100 WBC (ref 0–0.2)
PHOSPHATE SERPL-MCNC: 2.4 MG/DL (ref 2.5–4.5)
PLATELET # BLD AUTO: 221 10*3/MM3 (ref 140–450)
PMV BLD AUTO: 9.6 FL (ref 6–12)
POTASSIUM SERPL-SCNC: 3.6 MMOL/L (ref 3.5–5.2)
RBC # BLD AUTO: 4.84 10*6/MM3 (ref 4.14–5.8)
SODIUM SERPL-SCNC: 139 MMOL/L (ref 136–145)
VANCOMYCIN SERPL-MCNC: <4 MCG/ML (ref 5–40)
WBC NRBC COR # BLD: 8.37 10*3/MM3 (ref 3.4–10.8)

## 2022-08-12 PROCEDURE — 85025 COMPLETE CBC W/AUTO DIFF WBC: CPT | Performed by: HOSPITALIST

## 2022-08-12 PROCEDURE — 80202 ASSAY OF VANCOMYCIN: CPT | Performed by: HOSPITALIST

## 2022-08-12 PROCEDURE — 82962 GLUCOSE BLOOD TEST: CPT

## 2022-08-12 PROCEDURE — 84100 ASSAY OF PHOSPHORUS: CPT | Performed by: HOSPITALIST

## 2022-08-12 PROCEDURE — 80048 BASIC METABOLIC PNL TOTAL CA: CPT | Performed by: HOSPITALIST

## 2022-08-12 PROCEDURE — 25010000002 ENOXAPARIN PER 10 MG: Performed by: INTERNAL MEDICINE

## 2022-08-12 PROCEDURE — 83735 ASSAY OF MAGNESIUM: CPT | Performed by: HOSPITALIST

## 2022-08-12 PROCEDURE — 99232 SBSQ HOSP IP/OBS MODERATE 35: CPT | Performed by: INTERNAL MEDICINE

## 2022-08-12 PROCEDURE — 36415 COLL VENOUS BLD VENIPUNCTURE: CPT | Performed by: HOSPITALIST

## 2022-08-12 PROCEDURE — 99239 HOSP IP/OBS DSCHRG MGMT >30: CPT | Performed by: INTERNAL MEDICINE

## 2022-08-12 PROCEDURE — 25010000002 FUROSEMIDE PER 20 MG: Performed by: PHYSICIAN ASSISTANT

## 2022-08-12 RX ORDER — AMOXICILLIN AND CLAVULANATE POTASSIUM 875; 125 MG/1; MG/1
1 TABLET, FILM COATED ORAL 2 TIMES DAILY
Qty: 14 TABLET | Refills: 0 | Status: SHIPPED | OUTPATIENT
Start: 2022-08-12 | End: 2022-08-19

## 2022-08-12 RX ORDER — FENTANYL/ROPIVACAINE/NS/PF 2-625MCG/1
15 PLASTIC BAG, INJECTION (ML) EPIDURAL ONCE
Status: COMPLETED | OUTPATIENT
Start: 2022-08-12 | End: 2022-08-12

## 2022-08-12 RX ADMIN — POTASSIUM PHOSPHATE, MONOBASIC AND POTASSIUM PHOSPHATE, DIBASIC 15 MMOL: 224; 236 INJECTION, SOLUTION, CONCENTRATE INTRAVENOUS at 10:08

## 2022-08-12 RX ADMIN — ENOXAPARIN SODIUM 40 MG: 100 INJECTION SUBCUTANEOUS at 08:40

## 2022-08-12 RX ADMIN — PENICILLIN V POTASSIUM 500 MG: 250 TABLET, FILM COATED ORAL at 08:40

## 2022-08-12 RX ADMIN — SENNOSIDES AND DOCUSATE SODIUM 2 TABLET: 50; 8.6 TABLET ORAL at 08:40

## 2022-08-12 RX ADMIN — FUROSEMIDE 40 MG: 10 INJECTION, SOLUTION INTRAMUSCULAR; INTRAVENOUS at 08:39

## 2022-08-12 NOTE — PROGRESS NOTES
Reason for consultation respiratory failure    Subjective  Doing well  On room air  No complaints of shortness of breath  Patient wanting to go home  He is eating and drinking    Review of systems  Negative for any complaints      Vitals:    08/12/22 0900 08/12/22 1000 08/12/22 1200 08/12/22 1231   BP: 118/76 102/70 102/73    BP Location: Left arm Left arm Left arm    Patient Position: Sitting Lying Lying    Pulse:  89 73    Resp: 27      Temp:    98 °F (36.7 °C)   TempSrc:    Oral   SpO2: 99% 93% 95%    Weight:       Height:           Vital Signs Reviewed  General WDWN, Alert, NAD.    HEENT:  PERRL, EOMI.  OP, nares clear, no sinus tenderness  Neck:  Supple, no JVD, no thyromegaly  Lymph: no axillary, cervical, supraclavicular lymphadenopathy noted bilaterally  Chest:  good aeration, clear to auscultation bilaterally, tympanic to percussion bilaterally, no work of breathing noted  CV: RRR, no MGR, pulses 2+, equal.  Abd:  Soft, NT, ND, + BS, no HSM  EXT:  no clubbing, no cyanosis, no edema, no joint tenderness  Neuro: Awake and alert but somnolent CN grossly intact, no focal deficits.  Skin: No rashes or lesions noted     Assessment  aCute hypoxic and hypercapnic respiratory failure  Noncardiogenic pulmonary edema secondary to heroin  Acute heroin ingestion  Septic shock  Multifocal pneumonia likely aspiration concern for gram-positive and gram-negative organisms-secondary to strep agalactiae  Altered mental status-secondary to metabolic encephalopathy from heroin injection and CO2 narcosis  Assessment  Respiratory acidosis  Hemoptysis  Lactic acidosis  Acute renal failure  Transaminitis  Atrial fibrillation with rapid ventricular response    Plan  Patient doing well  Discontinue oxygen  Okay from my standpoint to discharge patient home  Patient to be discharged on amoxicillin 875 twice daily to complete 5 days  Replace phosphorus with IV potassium Phos    Electronically signed by Jose Melendrez DO,  08/12/22, 1:03 PM EDT.

## 2022-08-12 NOTE — PLAN OF CARE
Goal Outcome Evaluation:pt has remained stable vss, no c/o pain, has transfer orders.

## 2022-08-12 NOTE — DISCHARGE SUMMARY
[facility]         HOSPITALIST  DISCHARGE SUMMARY    Patient Name: Gregory Miller  : 1985  MRN: [id number]    Date of Admission: 2022  Date of Discharge:  2022    Primary Care Physician: Provider, No Known    Consults     Date and Time Order Name Status Description    2022 10:56 AM Inpatient Hospitalist Consult      2022 10:34 AM Inpatient Pulmonology Consult Completed           Active and Resolved Hospital Problems:  Active Hospital Problems    Diagnosis POA   • Drug overdose, accidental or unintentional, initial encounter [T50.901A] Unknown   • Polysubstance abuse (HCC) [F19.10] Unknown   • Pneumonia due to Streptococcus pneumoniae (HCC) [J13] Unknown   • AMS (altered mental status) [R41.82] Yes      Resolved Hospital Problems   No resolved problems to display.       Hospital Course     Hospital Course:  Gregory Miller is a 36 y.o. male that presented with drug overdose.  He arrived via EMS from home.  Patient was found unresponsive.  He was given 2 of Narcan immediately.  He was more responsive after the Narcan.  The EMS noticed that there are track marks on his left arm.  Patient acknowledges using heroin on day of admission. On arrival to the ED, patient had a temperature of 93.4, pulse of 143, respiratory rate of 26, blood pressure 110/99, and he was saturating 81%.  Patient eventually was placed on a high flow nasal cannula mask and was saturating 93%.Initial ABG showed: 7.1/52.2/less than 40/18.4.  His lactate was 5.8.  Patient's troponin was negative.  HIV negative.  Creatinine kinase was 240.  proBNP was 21.7.  Thyroid function was normal.  His white blood cell count is 11.61.  Ethanol was negative.  Leukos was 233.  Creatinine is 1.98.  Tox screen was positive for methamphetamine and opiates.Head CT shows no acute fracture or intracranial hemorrhage.  Chronic facial fractures.  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  CT chest shows motion artifact.  Extensive airspace consolidation through the lung fields possibly secondary to arts, multifocal pneumonia or pulmonary edema. Patient required intubation on August 9, 2022 for worsening respiratory failure.  Patient also and atrial fibrillation requiring diltiazem drip.  Patient eventually converted to normal sinus rhythm. Patient was extubated on August 10, 2022.    Repeat chest x-ray showing diffuse bilateral airspace opacities unchanged suggesting ongoing pneumonia.  Patient's BAL is showing Streptococcus pyogenes.  Patient has been started on antibiotic.  Patient is doing significantly better today and will be discharged home in stable condition.  Patient will complete a course of antibiotics for his infection.  Patient was reminded to avoid use of illegal drugs.  Patient should follow-up with his primary care doctor within 1 week.        DISCHARGE Follow Up Recommendations for labs and diagnostics: Stop using drugs       Day of Discharge     Vital Signs:  Temp:  [97.2 °F (36.2 °C)-99.5 °F (37.5 °C)] 98 °F (36.7 °C)  Heart Rate:  [] 73  Resp:  [17-27] 27  BP: ()/(58-80) 102/73  Flow (L/min):  [2] 2  Physical Exam:   Constitutional: Patient is in no distress. Resting in bed               Eyes: Pupils equal, sclerae anicteric, no conjunctival injection              HENT: NCAT, mucous membranes moist.              Neck: Supple, no thyromegaly, no lymphadenopathy, trachea midline              Respiratory: Clear to auscultation bilaterally, nonlabored respirations, No wheezing or rhonchi              Cardiovascular: RRR, no murmurs              Gastrointestinal: Positive bowel sounds, soft, nontender, nondistended              Musculoskeletal: No bilateral ankle edema, no clubbing or cyanosis to extremities              Psychiatric: normal mood and affect               Neurologic: no focal deficits               Skin: No  charanjit          Discharge Details        Discharge Medications      New Medications      Instructions Start Date   amoxicillin-clavulanate 875-125 MG per tablet  Commonly known as: Augmentin   1 tablet, Oral, 2 Times Daily             No Known Allergies    Discharge Disposition:  Home or Self Care    Diet:  Hospital:  Diet Order   Procedures   • Diet Regular       Discharge Activity: as tolerated       CODE STATUS:  Code Status and Medical Interventions:   Ordered at: 08/09/22 1145     Code Status (Patient has no pulse and is not breathing):    CPR (Attempt to Resuscitate)     Medical Interventions (Patient has pulse or is breathing):    Full Support     Release to patient:    Routine Release         No future appointments.    Additional Instructions for the Follow-ups that You Need to Schedule     Discharge Follow-up with PCP   As directed       Currently Documented PCP:    Provider, No Known    PCP Phone Number:    None     Follow Up Details: in 1 week               Pertinent  and/or Most Recent Results     PROCEDURES:   See chart     LAB RESULTS:      Lab 08/12/22  0548 08/11/22  0335 08/10/22  0843 08/10/22  0440 08/09/22  2047 08/09/22  1327 08/09/22  1326 08/09/22  1115 08/09/22  0954 08/09/22  0945 08/09/22  0936   WBC 8.37 12.01*  --  14.08*  --   --   --   --   --   --  11.61*   HEMOGLOBIN 13.5 13.8  --  14.7  --   --   --   --   --   --  16.8   HEMATOCRIT 40.2 41.9  --  44.7  --   --   --   --   --   --  54.4*   PLATELETS 221 192  --  294  --   --   --   --   --   --  440   NEUTROS ABS 6.09 9.49*  --  11.11*  --   --   --   --   --   --  8.49*   IMMATURE GRANS (ABS) 0.09* 0.09*  --  0.07*  --   --   --   --   --   --  0.49*   LYMPHS ABS 1.17 1.35  --  2.10  --   --   --   --   --   --  2.02   MONOS ABS 0.78 0.82  --  0.58  --   --   --   --   --   --  0.45   EOS ABS 0.21 0.20  --  0.12  --   --   --   --   --   --  0.08   MCV 83.1 84.8  --  83.9  --   --   --   --   --   --  88.3   PROCALCITONIN  --   13.02*  --   --   --  5.50*  --   --   --   --   --    LACTATE  --   --   --   --   --   --  1.7  --  5.8*  --   --    LACTATE, ARTERIAL  --   --   --   --   --   --   --  1.46  --  5.27*  --    PROTIME  --   --   --   --   --  15.9*  --   --   --   --   --    APTT  --   --  55.3* 173.7* 64.8* 28.3  --   --   --   --   --          Lab 08/12/22  0548 08/11/22  0335 08/10/22  0843 08/10/22  0440 08/09/22  1327 08/09/22  1115 08/09/22  0945 08/09/22  0936   SODIUM 139 135*  --  141  --   --   --  143   SODIUM, ARTERIAL  --   --   --   --   --  142.3 143.1  --    POTASSIUM 3.6 3.7  --  4.2  --   --   --  3.9   CHLORIDE 100 99  --  106  --   --   --  104   CO2 27.8 24.8  --  26.1  --   --   --  26.1   ANION GAP 11.2 11.2  --  8.9  --   --   --  12.9   BUN 10 17  --  19  --   --   --  18   CREATININE 0.86 1.24  --  1.45*  --   --   --  1.98*   EGFR 116.5 78.2  --  64.8  --   --   --  44.6*   GLUCOSE 81 76  --  93  --   --   --  233*   GLUCOSE, ARTERIAL  --   --   --   --   --  53* 172*  --    CALCIUM 9.5 8.5*  --  7.7*  --   --   --  9.4   IONIZED CALCIUM  --   --  1.01*  --   --  1.01* 1.21  --    MAGNESIUM 2.0 2.2  --  1.4*  --   --   --   --    PHOSPHORUS 2.4* 1.8*  --  4.1  --   --   --   --    TSH  --   --   --   --  0.687  --   --  2.090         Lab 08/11/22  0335 08/09/22  0936   TOTAL PROTEIN 6.0 7.5   ALBUMIN 2.80* 4.50   GLOBULIN  --  3.0   ALT (SGPT) 23 35   AST (SGOT) 31 43*   BILIRUBIN 3.0* 1.5*   INDIRECT BILIRUBIN 2.4  --    BILIRUBIN DIRECT 0.6*  --    ALK PHOS 65 80         Lab 08/09/22  1327 08/09/22  0936   PROBNP  --  21.7   TROPONIN T  --  0.028   PROTIME 15.9*  --    INR 1.25*  --                  Lab 08/09/22  1724 08/09/22  1115 08/09/22  0959   PH, ARTERIAL 7.254* 7.146* 7.196*   PCO2, ARTERIAL 43.9 59.9* 53.5*   PO2 ART 74.3* 61.8* <40.5*   O2 SATURATION ART 94.8* 89.4* 64.8*   FIO2 100 100 21   HCO3 ART 19.0* 20.2* 20.3*   BASE EXCESS ART -8.0* -9.5* -8.5*   CARBOXYHEMOGLOBIN 0.3 0.5 0.4      Brief Urine Lab Results     None        Microbiology Results (last 10 days)     Procedure Component Value - Date/Time    Pneumonia Panel - Lavage, Lung, R [[id number]]  (Abnormal) Collected: 08/10/22 0950    Lab Status: Final result Specimen: Lavage from Lung, R Updated: 08/11/22 0724     Escherichia coli PCR Not Detected     Acinetobacter calcoaceticus-baumannii complex PCR Not Detected     Enterobacter cloacae PCR Not Detected     Klebsiella oxytoca PCR Not Detected     Klebsiella pneumoniae group PCR Not Detected     Klebsiella aerogenes PCR Not Detected     Moraxella catarrhalis PCR Not Detected     Proteus species PCR Not Detected     Pseudomonas aeroginosa PCR Not Detected     Serratia marcescens PCR Not Detected     Staphylococcus aureus PCR Not Detected     Streptococcus pyogenes PCR Detected     Comment: 10^4 Bin copies/mL        Haemophilus influenzae PCR Not Detected     Streptococcus agalactiae PCR Not Detected     Streptococcus pneumoniae PCR Not Detected     Chlamydophila pneumoniae PCR Not Detected     Legionella pneumophilia PCR Not Detected     Mycoplasma pneumo by PCR Not Detected     ADENOVIRUS, PCR Not Detected     CTX-M Gene N/A     IMP Gene N/A     KPC Gene N/A     mecA/C and MREJ Gene N/A     NDM Gene N/A     OXA-48-like Gene N/A     VIM Gene N/A     Coronavirus Not Detected     Human Metapneumovirus Not Detected     Human Rhinovirus/Enterovirus Not Detected     Influenza A PCR Not Detected     Influenza B PCR Not Detected     RSV, PCR Not Detected     Parainfluenza virus PCR Not Detected    BAL Culture, Quantitative - Lavage, Bronchus [[id number]] Collected: 08/10/22 0950    Lab Status: Final result Specimen: Lavage from Bronchus Updated: 08/12/22 0720     BAL Culture No growth     Gram Stain Many (4+) WBCs seen      Few (2+) Gram positive cocci in pairs    Legionella Antigen, Urine - Urine, Urine, Clean Catch [[id number]]  (Normal) Collected: 08/09/22 1344    Lab Status: Final result  Specimen: Urine, Clean Catch Updated: 08/09/22 1423     LEGIONELLA ANTIGEN, URINE Negative    S. Pneumo Ag Urine or CSF - Urine, Urine, Clean Catch [[id number]]  (Normal) Collected: 08/09/22 1344    Lab Status: Final result Specimen: Urine, Clean Catch Updated: 08/09/22 1424     Strep Pneumo Ag Negative    COVID-19,APTIMA PANTHER(ALIX),[facility], NP/OP SWAB IN UTM/VTM/[facility] TRANSPORT MEDIA,24 HR TAT - Swab, Nasopharynx [[id number]]  (Normal) Collected: 08/09/22 1044    Lab Status: Final result Specimen: Swab from Nasopharynx Updated: 08/09/22 1813     COVID19 Not Detected    Narrative:      Fact sheet for providers: https://www.fda.gov/media/269841/download     Fact sheet for patients: https://www.fda.gov/media/805808/download    Test performed by RT PCR.    Blood Culture - Blood, Arm, Left [[id number]]  (Normal) Collected: 08/09/22 1038    Lab Status: Preliminary result Specimen: Blood from Arm, Left Updated: 08/12/22 1047     Blood Culture No growth at 3 days    Blood Culture - Blood, Arm, Left [[id number]]  (Normal) Collected: 08/09/22 0954    Lab Status: Preliminary result Specimen: Blood from Arm, Left Updated: 08/12/22 1000     Blood Culture No growth at 3 days          CT Head Without Contrast    Result Date: 8/9/2022  Impression:   1. No acute fracture or intracranial hemorrhage 2. Chronic facial fractures, as above 3. Chronic encephalomalacia in the anteroinferior frontal lobes bilaterally, likely posttraumatic and or post surgical in etiology.     Primo Andrews M.D.       Electronically Signed and Approved By: Primo Andrews M.D. on 8/09/2022 at 11:02             XR Chest 1 View    Result Date: 8/11/2022  Impression:  Diffuse bilateral airspace opacities are unchanged, suggesting ongoing pneumonia.       RICARDO WRIGHT MD       Electronically Signed and Approved By: RICARDO WRIGHT MD on 8/11/2022 at 11:03             XR Chest 1 View    Result Date: 8/9/2022  Impression:   1. The endotracheal tube and  right internal jugular line are in good position.  There is no pneumothorax.  2. Worsening bilateral airspace disease presumably representing multifocal pneumonia.       ELISE SPICER MD       Electronically Signed and Approved By: ELISE SPICER MD on 8/09/2022 at 17:51                       Results for orders placed during the hospital encounter of 08/09/22    Adult Transthoracic Echo Complete W/ Cont if Necessary Per Protocol    Interpretation Summary  Normal left ventricular systolic function.  No significant valve abnormalities noted.      Labs Pending at Discharge:  Pending Labs     Order Current Status    Blood Culture - Blood, Arm, Left Preliminary result    Blood Culture - Blood, Arm, Left Preliminary result            Time spent on Discharge including face to face service: more than 35  minutes    Electronically signed by Rogelio Man DO, 08/12/22, 1:33 PM EDT.

## 2022-08-14 LAB
BACTERIA SPEC AEROBE CULT: NORMAL
BACTERIA SPEC AEROBE CULT: NORMAL